# Patient Record
Sex: FEMALE | Employment: UNEMPLOYED | URBAN - METROPOLITAN AREA
[De-identification: names, ages, dates, MRNs, and addresses within clinical notes are randomized per-mention and may not be internally consistent; named-entity substitution may affect disease eponyms.]

---

## 2017-01-01 ENCOUNTER — HOSPITAL ENCOUNTER (INPATIENT)
Age: 67
LOS: 6 days | End: 2017-08-31
Attending: INTERNAL MEDICINE | Admitting: INTERNAL MEDICINE
Payer: OTHER MISCELLANEOUS

## 2017-01-01 ENCOUNTER — HOSPICE ADMISSION (OUTPATIENT)
Dept: HOSPICE | Age: 67
End: 2017-01-01
Payer: OTHER MISCELLANEOUS

## 2017-01-01 VITALS
DIASTOLIC BLOOD PRESSURE: 20 MMHG | SYSTOLIC BLOOD PRESSURE: 72 MMHG | TEMPERATURE: 102 F | RESPIRATION RATE: 12 BRPM | HEART RATE: 128 BPM | OXYGEN SATURATION: 55 %

## 2017-01-01 DIAGNOSIS — R45.1 AGITATION REQUIRING SEDATION PROTOCOL: ICD-10-CM

## 2017-01-01 DIAGNOSIS — Z71.89 GOALS OF CARE, COUNSELING/DISCUSSION: ICD-10-CM

## 2017-01-01 DIAGNOSIS — G89.3 CANCER ASSOCIATED PAIN: ICD-10-CM

## 2017-01-01 DIAGNOSIS — C34.90 METASTATIC LUNG CANCER (METASTASIS FROM LUNG TO OTHER SITE), UNSPECIFIED LATERALITY (HCC): ICD-10-CM

## 2017-01-01 PROCEDURE — 74011000250 HC RX REV CODE- 250: Performed by: FAMILY MEDICINE

## 2017-01-01 PROCEDURE — 74011000250 HC RX REV CODE- 250: Performed by: INTERNAL MEDICINE

## 2017-01-01 PROCEDURE — 74011250636 HC RX REV CODE- 250/636: Performed by: FAMILY MEDICINE

## 2017-01-01 PROCEDURE — 74011250637 HC RX REV CODE- 250/637: Performed by: INTERNAL MEDICINE

## 2017-01-01 PROCEDURE — 0656 HSPC GENERAL INPATIENT

## 2017-01-01 PROCEDURE — 74011250636 HC RX REV CODE- 250/636: Performed by: INTERNAL MEDICINE

## 2017-01-01 RX ORDER — HYDROMORPHONE HYDROCHLORIDE 2 MG/ML
1 INJECTION, SOLUTION INTRAMUSCULAR; INTRAVENOUS; SUBCUTANEOUS EVERY 4 HOURS
Status: DISCONTINUED | OUTPATIENT
Start: 2017-01-01 | End: 2017-01-01

## 2017-01-01 RX ORDER — HYDROMORPHONE HYDROCHLORIDE 1 MG/ML
1 INJECTION, SOLUTION INTRAMUSCULAR; INTRAVENOUS; SUBCUTANEOUS
Status: DISCONTINUED | OUTPATIENT
Start: 2017-01-01 | End: 2017-01-01

## 2017-01-01 RX ORDER — LORAZEPAM 2 MG/ML
2 INJECTION INTRAMUSCULAR
Status: DISCONTINUED | OUTPATIENT
Start: 2017-01-01 | End: 2017-01-01 | Stop reason: HOSPADM

## 2017-01-01 RX ORDER — LORAZEPAM 2 MG/ML
2 INJECTION INTRAMUSCULAR
Status: DISCONTINUED | OUTPATIENT
Start: 2017-01-01 | End: 2017-01-01

## 2017-01-01 RX ORDER — FACIAL-BODY WIPES
10 EACH TOPICAL DAILY PRN
Status: DISCONTINUED | OUTPATIENT
Start: 2017-01-01 | End: 2017-01-01 | Stop reason: HOSPADM

## 2017-01-01 RX ORDER — SENNOSIDES 8.8 MG/5ML
5 LIQUID ORAL
Status: DISCONTINUED | OUTPATIENT
Start: 2017-01-01 | End: 2017-01-01

## 2017-01-01 RX ORDER — HYDROMORPHONE HYDROCHLORIDE 1 MG/ML
4 INJECTION, SOLUTION INTRAMUSCULAR; INTRAVENOUS; SUBCUTANEOUS
Status: DISCONTINUED | OUTPATIENT
Start: 2017-01-01 | End: 2017-01-01 | Stop reason: HOSPADM

## 2017-01-01 RX ORDER — SENNOSIDES 8.6 MG/1
2 TABLET ORAL DAILY
Status: DISCONTINUED | OUTPATIENT
Start: 2017-01-01 | End: 2017-01-01

## 2017-01-01 RX ORDER — HALOPERIDOL 5 MG/ML
2 INJECTION INTRAMUSCULAR
Status: DISCONTINUED | OUTPATIENT
Start: 2017-01-01 | End: 2017-01-01 | Stop reason: HOSPADM

## 2017-01-01 RX ORDER — GLYCOPYRROLATE 0.2 MG/ML
0.2 INJECTION INTRAMUSCULAR; INTRAVENOUS
Status: DISCONTINUED | OUTPATIENT
Start: 2017-01-01 | End: 2017-01-01

## 2017-01-01 RX ORDER — MORPHINE SULFATE 20 MG/ML
10 SOLUTION ORAL 4 TIMES DAILY
Status: DISCONTINUED | OUTPATIENT
Start: 2017-01-01 | End: 2017-01-01

## 2017-01-01 RX ORDER — DEXAMETHASONE 4 MG/1
2 TABLET ORAL EVERY 12 HOURS
Status: DISCONTINUED | OUTPATIENT
Start: 2017-01-01 | End: 2017-01-01

## 2017-01-01 RX ORDER — MORPHINE SULFATE 20 MG/ML
10 SOLUTION ORAL
Status: DISCONTINUED | OUTPATIENT
Start: 2017-01-01 | End: 2017-01-01

## 2017-01-01 RX ORDER — HYDROMORPHONE HYDROCHLORIDE 2 MG/ML
4 INJECTION, SOLUTION INTRAMUSCULAR; INTRAVENOUS; SUBCUTANEOUS
Status: DISCONTINUED | OUTPATIENT
Start: 2017-01-01 | End: 2017-01-01 | Stop reason: HOSPADM

## 2017-01-01 RX ORDER — HYDROMORPHONE HCL IN 0.9% NACL 15 MG/30ML
PATIENT CONTROLLED ANALGESIA VIAL INTRAVENOUS CONTINUOUS
Status: DISCONTINUED | OUTPATIENT
Start: 2017-01-01 | End: 2017-01-01

## 2017-01-01 RX ORDER — GLYCOPYRROLATE 0.2 MG/ML
0.2 INJECTION INTRAMUSCULAR; INTRAVENOUS
Status: DISCONTINUED | OUTPATIENT
Start: 2017-01-01 | End: 2017-01-01 | Stop reason: HOSPADM

## 2017-01-01 RX ORDER — GLYCOPYRROLATE 0.2 MG/ML
0.2 INJECTION INTRAMUSCULAR; INTRAVENOUS EVERY 4 HOURS
Status: DISCONTINUED | OUTPATIENT
Start: 2017-01-01 | End: 2017-01-01 | Stop reason: HOSPADM

## 2017-01-01 RX ORDER — ATROPINE SULFATE 10 MG/ML
3 SOLUTION/ DROPS OPHTHALMIC
Status: DISCONTINUED | OUTPATIENT
Start: 2017-01-01 | End: 2017-01-01 | Stop reason: HOSPADM

## 2017-01-01 RX ORDER — FENTANYL 25 UG/1
1 PATCH TRANSDERMAL
Status: DISCONTINUED | OUTPATIENT
Start: 2017-01-01 | End: 2017-01-01

## 2017-01-01 RX ORDER — LORAZEPAM 2 MG/ML
2 INJECTION INTRAMUSCULAR EVERY 4 HOURS
Status: DISCONTINUED | OUTPATIENT
Start: 2017-01-01 | End: 2017-01-01

## 2017-01-01 RX ORDER — HALOPERIDOL 2 MG/ML
2 SOLUTION ORAL
Status: DISCONTINUED | OUTPATIENT
Start: 2017-01-01 | End: 2017-01-01

## 2017-01-01 RX ORDER — PHENOBARBITAL SODIUM 130 MG/ML
30 INJECTION INTRAMUSCULAR
Status: DISCONTINUED | OUTPATIENT
Start: 2017-01-01 | End: 2017-01-01 | Stop reason: HOSPADM

## 2017-01-01 RX ORDER — DEXAMETHASONE SODIUM PHOSPHATE 4 MG/ML
2 INJECTION, SOLUTION INTRA-ARTICULAR; INTRALESIONAL; INTRAMUSCULAR; INTRAVENOUS; SOFT TISSUE EVERY 12 HOURS
Status: DISCONTINUED | OUTPATIENT
Start: 2017-01-01 | End: 2017-01-01

## 2017-01-01 RX ORDER — LORAZEPAM 2 MG/ML
2 CONCENTRATE ORAL EVERY 4 HOURS
Status: DISCONTINUED | OUTPATIENT
Start: 2017-01-01 | End: 2017-01-01

## 2017-01-01 RX ORDER — HYOSCYAMINE SULFATE 0.12 MG/1
0.12 TABLET SUBLINGUAL
Status: DISCONTINUED | OUTPATIENT
Start: 2017-01-01 | End: 2017-01-01

## 2017-01-01 RX ORDER — PROCHLORPERAZINE MALEATE 10 MG
10 TABLET ORAL
Status: DISCONTINUED | OUTPATIENT
Start: 2017-01-01 | End: 2017-01-01 | Stop reason: HOSPADM

## 2017-01-01 RX ORDER — LORAZEPAM 2 MG/ML
1 CONCENTRATE ORAL
Status: DISCONTINUED | OUTPATIENT
Start: 2017-01-01 | End: 2017-01-01

## 2017-01-01 RX ORDER — AMOXICILLIN 250 MG
2 CAPSULE ORAL
Status: DISCONTINUED | OUTPATIENT
Start: 2017-01-01 | End: 2017-01-01

## 2017-01-01 RX ORDER — NYSTATIN 100000 [USP'U]/ML
500000 SUSPENSION ORAL 4 TIMES DAILY
Status: DISCONTINUED | OUTPATIENT
Start: 2017-01-01 | End: 2017-01-01

## 2017-01-01 RX ORDER — KETOROLAC TROMETHAMINE 30 MG/ML
15 INJECTION, SOLUTION INTRAMUSCULAR; INTRAVENOUS
Status: DISCONTINUED | OUTPATIENT
Start: 2017-01-01 | End: 2017-01-01 | Stop reason: HOSPADM

## 2017-01-01 RX ORDER — LORAZEPAM 2 MG/ML
4 INJECTION INTRAMUSCULAR
Status: DISCONTINUED | OUTPATIENT
Start: 2017-01-01 | End: 2017-01-01 | Stop reason: HOSPADM

## 2017-01-01 RX ORDER — HYDROMORPHONE HYDROCHLORIDE 10 MG/ML
4 INJECTION INTRAMUSCULAR; INTRAVENOUS; SUBCUTANEOUS
Status: DISCONTINUED | OUTPATIENT
Start: 2017-01-01 | End: 2017-01-01

## 2017-01-01 RX ORDER — ZOLPIDEM TARTRATE 5 MG/1
5 TABLET ORAL
Status: DISCONTINUED | OUTPATIENT
Start: 2017-01-01 | End: 2017-01-01

## 2017-01-01 RX ORDER — ACETAMINOPHEN 650 MG/1
650 SUPPOSITORY RECTAL
Status: DISCONTINUED | OUTPATIENT
Start: 2017-01-01 | End: 2017-01-01 | Stop reason: HOSPADM

## 2017-01-01 RX ORDER — ATROPINE SULFATE 10 MG/ML
1 SOLUTION/ DROPS OPHTHALMIC
Status: DISCONTINUED | OUTPATIENT
Start: 2017-01-01 | End: 2017-01-01

## 2017-01-01 RX ADMIN — LORAZEPAM 4 MG: 2 INJECTION INTRAMUSCULAR at 08:30

## 2017-01-01 RX ADMIN — HYDROMORPHONE HYDROCHLORIDE 4 MG: 2 INJECTION, SOLUTION INTRAMUSCULAR; INTRAVENOUS; SUBCUTANEOUS at 08:00

## 2017-01-01 RX ADMIN — GLYCOPYRROLATE 0.2 MG: 0.2 INJECTION INTRAMUSCULAR; INTRAVENOUS at 22:26

## 2017-01-01 RX ADMIN — Medication: at 10:13

## 2017-01-01 RX ADMIN — NYSTATIN 500000 UNITS: 100000 SUSPENSION ORAL at 17:52

## 2017-01-01 RX ADMIN — NYSTATIN 500000 UNITS: 100000 SUSPENSION ORAL at 12:12

## 2017-01-01 RX ADMIN — HYDROMORPHONE HYDROCHLORIDE 4 MG: 2 INJECTION, SOLUTION INTRAMUSCULAR; INTRAVENOUS; SUBCUTANEOUS at 16:00

## 2017-01-01 RX ADMIN — MORPHINE SULFATE 10 MG: 20 SOLUTION ORAL at 18:19

## 2017-01-01 RX ADMIN — HYDROMORPHONE HYDROCHLORIDE 4 MG: 2 INJECTION, SOLUTION INTRAMUSCULAR; INTRAVENOUS; SUBCUTANEOUS at 22:00

## 2017-01-01 RX ADMIN — LORAZEPAM 4 MG: 2 INJECTION INTRAMUSCULAR at 20:01

## 2017-01-01 RX ADMIN — HYDROMORPHONE HYDROCHLORIDE 4 MG: 2 INJECTION, SOLUTION INTRAMUSCULAR; INTRAVENOUS; SUBCUTANEOUS at 14:00

## 2017-01-01 RX ADMIN — LORAZEPAM 4 MG: 2 INJECTION INTRAMUSCULAR at 20:23

## 2017-01-01 RX ADMIN — Medication: at 09:04

## 2017-01-01 RX ADMIN — HYDROMORPHONE HYDROCHLORIDE 4 MG: 2 INJECTION, SOLUTION INTRAMUSCULAR; INTRAVENOUS; SUBCUTANEOUS at 20:00

## 2017-01-01 RX ADMIN — HYDROMORPHONE HYDROCHLORIDE 4 MG: 2 INJECTION, SOLUTION INTRAMUSCULAR; INTRAVENOUS; SUBCUTANEOUS at 06:00

## 2017-01-01 RX ADMIN — LORAZEPAM 4 MG: 2 INJECTION INTRAMUSCULAR at 02:00

## 2017-01-01 RX ADMIN — LORAZEPAM 4 MG: 2 INJECTION INTRAMUSCULAR at 16:37

## 2017-01-01 RX ADMIN — LORAZEPAM 4 MG: 2 INJECTION INTRAMUSCULAR at 14:19

## 2017-01-01 RX ADMIN — HALOPERIDOL LACTATE 2 MG: 5 INJECTION, SOLUTION INTRAMUSCULAR at 07:36

## 2017-01-01 RX ADMIN — LORAZEPAM 1 MG: 2 SOLUTION, CONCENTRATE ORAL at 01:55

## 2017-01-01 RX ADMIN — HYDROMORPHONE HYDROCHLORIDE 4 MG: 2 INJECTION, SOLUTION INTRAMUSCULAR; INTRAVENOUS; SUBCUTANEOUS at 04:00

## 2017-01-01 RX ADMIN — LORAZEPAM 4 MG: 2 INJECTION INTRAMUSCULAR at 18:01

## 2017-01-01 RX ADMIN — LORAZEPAM 4 MG: 2 INJECTION INTRAMUSCULAR at 00:00

## 2017-01-01 RX ADMIN — ATROPINE SULFATE 1 DROP: 10 SOLUTION/ DROPS OPHTHALMIC at 09:54

## 2017-01-01 RX ADMIN — Medication: at 11:00

## 2017-01-01 RX ADMIN — MORPHINE SULFATE 10 MG: 20 SOLUTION ORAL at 00:06

## 2017-01-01 RX ADMIN — LORAZEPAM 2 MG: 2 INJECTION INTRAMUSCULAR; INTRAVENOUS at 00:05

## 2017-01-01 RX ADMIN — HYDROMORPHONE HYDROCHLORIDE 4 MG: 2 INJECTION, SOLUTION INTRAMUSCULAR; INTRAVENOUS; SUBCUTANEOUS at 12:00

## 2017-01-01 RX ADMIN — LORAZEPAM 4 MG: 2 INJECTION INTRAMUSCULAR at 18:23

## 2017-01-01 RX ADMIN — LORAZEPAM 4 MG: 2 INJECTION INTRAMUSCULAR at 22:00

## 2017-01-01 RX ADMIN — MORPHINE SULFATE 10 MG: 20 SOLUTION ORAL at 18:00

## 2017-01-01 RX ADMIN — HYDROMORPHONE HYDROCHLORIDE 4 MG: 2 INJECTION, SOLUTION INTRAMUSCULAR; INTRAVENOUS; SUBCUTANEOUS at 10:00

## 2017-01-01 RX ADMIN — ATROPINE SULFATE 1 DROP: 10 SOLUTION/ DROPS OPHTHALMIC at 15:00

## 2017-01-01 RX ADMIN — Medication: at 14:20

## 2017-01-01 RX ADMIN — HALOPERIDOL LACTATE 2 MG: 5 INJECTION, SOLUTION INTRAMUSCULAR at 12:10

## 2017-01-01 RX ADMIN — LORAZEPAM 2 MG: 2 INJECTION INTRAMUSCULAR; INTRAVENOUS at 15:10

## 2017-01-01 RX ADMIN — Medication: at 05:28

## 2017-01-01 RX ADMIN — HYDROMORPHONE HYDROCHLORIDE 4 MG: 2 INJECTION, SOLUTION INTRAMUSCULAR; INTRAVENOUS; SUBCUTANEOUS at 02:00

## 2017-01-01 RX ADMIN — GLYCOPYRROLATE 0.2 MG: 0.2 INJECTION INTRAMUSCULAR; INTRAVENOUS at 01:55

## 2017-01-01 RX ADMIN — HYDROMORPHONE HYDROCHLORIDE 4 MG: 2 INJECTION, SOLUTION INTRAMUSCULAR; INTRAVENOUS; SUBCUTANEOUS at 00:00

## 2017-01-01 RX ADMIN — KETOROLAC TROMETHAMINE 15 MG: 30 INJECTION, SOLUTION INTRAMUSCULAR at 00:07

## 2017-01-01 RX ADMIN — ATROPINE SULFATE 3 DROP: 10 SOLUTION/ DROPS OPHTHALMIC at 08:00

## 2017-01-01 RX ADMIN — LORAZEPAM 2 MG: 2 INJECTION INTRAMUSCULAR at 03:57

## 2017-01-01 RX ADMIN — LORAZEPAM 4 MG: 2 INJECTION INTRAMUSCULAR at 16:28

## 2017-01-01 RX ADMIN — GLYCOPYRROLATE 0.2 MG: 0.2 INJECTION INTRAMUSCULAR; INTRAVENOUS at 14:00

## 2017-01-01 RX ADMIN — LORAZEPAM 4 MG: 2 INJECTION INTRAMUSCULAR at 08:00

## 2017-01-01 RX ADMIN — LORAZEPAM 4 MG: 2 INJECTION INTRAMUSCULAR at 01:44

## 2017-01-01 RX ADMIN — Medication: at 12:11

## 2017-01-01 RX ADMIN — ATROPINE SULFATE 1 DROP: 10 SOLUTION/ DROPS OPHTHALMIC at 05:48

## 2017-01-01 RX ADMIN — HALOPERIDOL LACTATE 2 MG: 2 SOLUTION, CONCENTRATE ORAL at 00:06

## 2017-01-01 RX ADMIN — LORAZEPAM 4 MG: 2 INJECTION INTRAMUSCULAR at 08:02

## 2017-01-01 RX ADMIN — ATROPINE SULFATE 3 DROP: 10 SOLUTION/ DROPS OPHTHALMIC at 16:13

## 2017-01-01 RX ADMIN — LORAZEPAM 2 MG: 2 INJECTION INTRAMUSCULAR; INTRAVENOUS at 14:21

## 2017-01-01 RX ADMIN — LORAZEPAM 4 MG: 2 INJECTION INTRAMUSCULAR at 10:54

## 2017-01-01 RX ADMIN — NYSTATIN 500000 UNITS: 100000 SUSPENSION ORAL at 21:08

## 2017-01-01 RX ADMIN — GLYCOPYRROLATE 0.2 MG: 0.2 INJECTION INTRAMUSCULAR; INTRAVENOUS at 17:00

## 2017-01-01 RX ADMIN — LORAZEPAM 2 MG: 2 INJECTION INTRAMUSCULAR; INTRAVENOUS at 07:37

## 2017-01-01 RX ADMIN — LORAZEPAM 4 MG: 2 INJECTION INTRAMUSCULAR at 12:10

## 2017-01-01 RX ADMIN — LORAZEPAM 2 MG: 2 INJECTION INTRAMUSCULAR at 19:49

## 2017-01-01 RX ADMIN — LORAZEPAM 4 MG: 2 INJECTION INTRAMUSCULAR at 10:11

## 2017-01-01 RX ADMIN — HYDROMORPHONE HYDROCHLORIDE 4 MG: 2 INJECTION, SOLUTION INTRAMUSCULAR; INTRAVENOUS; SUBCUTANEOUS at 18:00

## 2017-01-01 RX ADMIN — MORPHINE SULFATE 10 MG: 20 SOLUTION ORAL at 01:56

## 2017-01-01 RX ADMIN — LORAZEPAM 2 MG: 2 INJECTION INTRAMUSCULAR; INTRAVENOUS at 07:54

## 2017-01-01 RX ADMIN — GLYCOPYRROLATE 0.2 MG: 0.2 INJECTION INTRAMUSCULAR; INTRAVENOUS at 12:11

## 2017-01-01 RX ADMIN — LORAZEPAM 4 MG: 2 INJECTION INTRAMUSCULAR at 17:49

## 2017-01-01 RX ADMIN — GLYCOPYRROLATE 0.2 MG: 0.2 INJECTION INTRAMUSCULAR; INTRAVENOUS at 06:44

## 2017-01-01 RX ADMIN — HALOPERIDOL LACTATE 2 MG: 5 INJECTION, SOLUTION INTRAMUSCULAR at 19:49

## 2017-01-01 RX ADMIN — LORAZEPAM 2 MG: 2 SOLUTION, CONCENTRATE ORAL at 19:44

## 2017-01-01 RX ADMIN — GLYCOPYRROLATE 0.2 MG: 0.2 INJECTION INTRAMUSCULAR; INTRAVENOUS at 20:35

## 2017-01-01 RX ADMIN — LORAZEPAM 2 MG: 2 INJECTION INTRAMUSCULAR; INTRAVENOUS at 10:28

## 2017-01-01 RX ADMIN — LORAZEPAM 4 MG: 2 INJECTION INTRAMUSCULAR at 21:46

## 2017-01-01 RX ADMIN — HALOPERIDOL LACTATE 2 MG: 5 INJECTION, SOLUTION INTRAMUSCULAR at 04:09

## 2017-01-01 RX ADMIN — LORAZEPAM 2 MG: 2 INJECTION INTRAMUSCULAR at 16:13

## 2017-01-01 RX ADMIN — LORAZEPAM 4 MG: 2 INJECTION INTRAMUSCULAR at 10:00

## 2017-01-01 RX ADMIN — GLYCOPYRROLATE 0.2 MG: 0.2 INJECTION INTRAMUSCULAR; INTRAVENOUS at 05:16

## 2017-01-01 RX ADMIN — LORAZEPAM 2 MG: 2 SOLUTION, CONCENTRATE ORAL at 23:43

## 2017-01-01 RX ADMIN — NYSTATIN 500000 UNITS: 100000 SUSPENSION ORAL at 18:23

## 2017-01-01 RX ADMIN — DEXAMETHASONE 2 MG: 4 TABLET ORAL at 21:08

## 2017-01-01 RX ADMIN — GLYCOPYRROLATE 0.2 MG: 0.2 INJECTION INTRAMUSCULAR; INTRAVENOUS at 07:36

## 2017-01-01 RX ADMIN — LORAZEPAM 4 MG: 2 INJECTION INTRAMUSCULAR at 23:58

## 2017-01-01 RX ADMIN — DEXAMETHASONE SODIUM PHOSPHATE 2 MG: 4 INJECTION, SOLUTION INTRAMUSCULAR; INTRAVENOUS at 07:53

## 2017-01-01 RX ADMIN — LORAZEPAM 4 MG: 2 INJECTION INTRAMUSCULAR at 22:14

## 2017-01-01 RX ADMIN — LORAZEPAM 2 MG: 2 SOLUTION, CONCENTRATE ORAL at 17:00

## 2017-01-01 RX ADMIN — LORAZEPAM 2 MG: 2 INJECTION INTRAMUSCULAR; INTRAVENOUS at 09:17

## 2017-01-01 RX ADMIN — LORAZEPAM 4 MG: 2 INJECTION INTRAMUSCULAR at 04:33

## 2017-01-01 RX ADMIN — GLYCOPYRROLATE 0.2 MG: 0.2 INJECTION INTRAMUSCULAR; INTRAVENOUS at 00:06

## 2017-01-01 RX ADMIN — MORPHINE SULFATE 10 MG: 20 SOLUTION ORAL at 21:08

## 2017-01-01 RX ADMIN — LORAZEPAM 2 MG: 2 INJECTION INTRAMUSCULAR; INTRAVENOUS at 19:28

## 2017-01-01 RX ADMIN — Medication: at 10:00

## 2017-01-01 RX ADMIN — GLYCOPYRROLATE 0.2 MG: 0.2 INJECTION INTRAMUSCULAR; INTRAVENOUS at 14:19

## 2017-01-01 RX ADMIN — HYDROMORPHONE HYDROCHLORIDE 1 MG: 2 INJECTION, SOLUTION INTRAMUSCULAR; INTRAVENOUS; SUBCUTANEOUS at 08:00

## 2017-01-01 RX ADMIN — GLYCOPYRROLATE 0.2 MG: 0.2 INJECTION, SOLUTION INTRAMUSCULAR; INTRAVENOUS at 02:00

## 2017-01-01 RX ADMIN — GLYCOPYRROLATE 0.2 MG: 0.2 INJECTION INTRAMUSCULAR; INTRAVENOUS at 18:01

## 2017-01-01 RX ADMIN — LORAZEPAM 4 MG: 2 INJECTION INTRAMUSCULAR at 06:26

## 2017-01-01 RX ADMIN — LORAZEPAM 2 MG: 2 INJECTION INTRAMUSCULAR; INTRAVENOUS at 03:32

## 2017-01-01 RX ADMIN — HYDROMORPHONE HYDROCHLORIDE 4 MG: 1 INJECTION, SOLUTION INTRAMUSCULAR; INTRAVENOUS; SUBCUTANEOUS at 11:17

## 2017-01-01 RX ADMIN — ATROPINE SULFATE 3 DROP: 10 SOLUTION/ DROPS OPHTHALMIC at 02:00

## 2017-01-01 RX ADMIN — ATROPINE SULFATE 3 DROP: 10 SOLUTION/ DROPS OPHTHALMIC at 22:00

## 2017-01-01 RX ADMIN — LORAZEPAM 2 MG: 2 INJECTION INTRAMUSCULAR; INTRAVENOUS at 17:53

## 2017-01-01 RX ADMIN — LORAZEPAM 4 MG: 2 INJECTION INTRAMUSCULAR at 14:41

## 2017-01-01 RX ADMIN — LORAZEPAM 4 MG: 2 INJECTION INTRAMUSCULAR at 19:49

## 2017-01-01 RX ADMIN — LORAZEPAM 2 MG: 2 INJECTION INTRAMUSCULAR; INTRAVENOUS at 13:49

## 2017-01-01 RX ADMIN — LORAZEPAM 2 MG: 2 INJECTION INTRAMUSCULAR; INTRAVENOUS at 01:55

## 2017-01-01 RX ADMIN — Medication: at 08:17

## 2017-01-01 RX ADMIN — MORPHINE SULFATE 10 MG: 20 SOLUTION ORAL at 19:35

## 2017-01-01 RX ADMIN — Medication: at 18:38

## 2017-01-01 RX ADMIN — LORAZEPAM 4 MG: 2 INJECTION INTRAMUSCULAR at 16:06

## 2017-01-01 RX ADMIN — LORAZEPAM 4 MG: 2 INJECTION INTRAMUSCULAR at 09:53

## 2017-01-01 RX ADMIN — ATROPINE SULFATE 3 DROP: 10 SOLUTION/ DROPS OPHTHALMIC at 06:45

## 2017-01-01 RX ADMIN — LORAZEPAM 4 MG: 2 INJECTION INTRAMUSCULAR at 11:43

## 2017-01-01 RX ADMIN — LORAZEPAM 4 MG: 2 INJECTION INTRAMUSCULAR at 23:46

## 2017-01-01 RX ADMIN — LORAZEPAM 4 MG: 2 INJECTION INTRAMUSCULAR at 13:59

## 2017-01-01 RX ADMIN — GLYCOPYRROLATE 0.2 MG: 0.2 INJECTION INTRAMUSCULAR; INTRAVENOUS at 08:03

## 2017-01-01 RX ADMIN — LORAZEPAM 2 MG: 2 INJECTION INTRAMUSCULAR at 03:42

## 2017-01-01 RX ADMIN — LORAZEPAM 4 MG: 2 INJECTION INTRAMUSCULAR at 03:49

## 2017-01-01 RX ADMIN — NYSTATIN 500000 UNITS: 100000 SUSPENSION ORAL at 07:53

## 2017-01-01 RX ADMIN — HYDROMORPHONE HYDROCHLORIDE 4 MG: 2 INJECTION, SOLUTION INTRAMUSCULAR; INTRAVENOUS; SUBCUTANEOUS at 20:30

## 2017-01-01 RX ADMIN — LORAZEPAM 4 MG: 2 INJECTION INTRAMUSCULAR at 18:29

## 2017-01-01 RX ADMIN — LORAZEPAM 4 MG: 2 INJECTION INTRAMUSCULAR at 09:48

## 2017-01-01 RX ADMIN — LORAZEPAM 2 MG: 2 INJECTION INTRAMUSCULAR; INTRAVENOUS at 02:50

## 2017-01-01 RX ADMIN — LORAZEPAM 4 MG: 2 INJECTION INTRAMUSCULAR at 06:00

## 2017-01-01 RX ADMIN — LORAZEPAM 4 MG: 2 INJECTION INTRAMUSCULAR at 20:30

## 2017-01-01 RX ADMIN — LORAZEPAM 4 MG: 2 INJECTION INTRAMUSCULAR at 14:22

## 2017-01-01 RX ADMIN — LORAZEPAM 4 MG: 2 INJECTION INTRAMUSCULAR at 04:10

## 2017-01-01 RX ADMIN — LORAZEPAM 2 MG: 2 INJECTION INTRAMUSCULAR; INTRAVENOUS at 22:01

## 2017-01-01 RX ADMIN — LORAZEPAM 4 MG: 2 INJECTION INTRAMUSCULAR at 02:25

## 2017-01-01 RX ADMIN — LORAZEPAM 4 MG: 2 INJECTION INTRAMUSCULAR at 04:01

## 2017-01-01 RX ADMIN — LORAZEPAM 2 MG: 2 INJECTION INTRAMUSCULAR; INTRAVENOUS at 05:28

## 2017-01-01 RX ADMIN — LORAZEPAM 4 MG: 2 INJECTION INTRAMUSCULAR at 06:22

## 2017-01-01 RX ADMIN — LORAZEPAM 4 MG: 2 INJECTION INTRAMUSCULAR at 00:06

## 2017-01-01 RX ADMIN — HALOPERIDOL LACTATE 2 MG: 5 INJECTION, SOLUTION INTRAMUSCULAR at 03:56

## 2017-01-01 RX ADMIN — Medication: at 09:49

## 2017-01-01 RX ADMIN — GLYCOPYRROLATE 0.2 MG: 0.2 INJECTION INTRAMUSCULAR; INTRAVENOUS at 08:02

## 2017-01-01 RX ADMIN — LORAZEPAM 4 MG: 2 INJECTION INTRAMUSCULAR at 12:37

## 2017-08-25 NOTE — PROGRESS NOTES
1600  Pt arrived at the [facility]. Pt was alert to self. Pt is thriving in bed. Pt reports pain is 8/10. Daughter at the bedside. Crystal 145 at the bedside writing notes.

## 2017-08-26 NOTE — PROGRESS NOTES
Problem: Falls - Risk of  Goal: *Absence of Falls  Document Ganesh Fall Risk and appropriate interventions in the flowsheet.    Outcome: Progressing Towards Goal  Fall Risk Interventions:  Mobility Interventions: Communicate number of staff needed for ambulation/transfer, Bed/chair exit alarm     Mentation Interventions: Bed/chair exit alarm, Door open when patient unattended, Family/sitter at bedside     Medication Interventions: Bed/chair exit alarm     Elimination Interventions: Bed/chair exit alarm, Call light in reach, Patient to call for help with toileting needs     History of Falls Interventions: Bed/chair exit alarm, Door open when patient unattended, Evaluate medications/consider consulting pharmacy

## 2017-08-26 NOTE — PROGRESS NOTES
0700 Recd report from [facility], patient remains GIP LOC. Medication were changed last pm to intravenous and the Left chest port was accessed. Pt had issues with urination retention and a jefferson was inserted. Sister remained at bedside throught he night. 0800 Scheduled ativan and dilaudid, pt is currently writhing in bed and moaning, vital signs taken 98.3-89-24 90% saturation on room air, /60  0830 Pt has calmed slightly still tossing and turning in bed, bed alarms have been set  6687 Dr Nghia Ponce on board rounding on patient and reviewing medications. 0482 Ativan 2mg given PRN due to restlessness until order for Dilaudid PCA available. Updated family on careplan and medication changes. Verbalized understanding. 1028 Ativan 2mg prn given by coworker for restlessness. 1100 Dilaudid 0.5 mg bolus given for writhing in pain. 1200 Pain reassessed, pt is resting quietly at this time. 1200 Ativan held pt very comfortable at this time resp 8.  1400 Pt up opening eyes, breathing irregular, haspy sound, mouth care done, HR is dropping from 90 to 69, Sats 89%, pt is warm and perfusing. Family questions how long, EOL teaching reviewed with signs and symptoms. Pt starting to twist and turn in bed again, ativan 2mg given that was held at 1200 due to lethargy. Also 0.5 mg Dilaudid given at this time, pt repositioned. 1 Family called pt restless again, 1600 scheduled ativan 2mg given early, pt repositioned  1630 Resting quietly at this time, ativan effective. Asked family to turn heat down it is 78 degrees, they just wanted door open for a bit. 1800 PRN ativan for agitation, noted pt now has moist tracheal rales, MD notified and Robinul has been ordered. Ativan is also changed to every 2 hrs scheduled. Dilaudid PCA syringe changed. 1900 Report to oncoming shift.     NAME OF PATIENT:  Shantel      LEVEL OF CARE:  GIP     REASON FOR GIP:   Pain, despite numerous changes in medications, Terminal agitation, despite changes to medications, Medication adjustment that must be monitored 24/7 and Stabilizing treatment that cannot take place at home     *PATIENT REMAINS ELIGIBLE FOR [facility] LEVEL OF CARE AS EVIDENCED BY: (MUST BE ADDRESSED OF PATIENT GIP) Medications were changed during the night to intravenous via port.  Patient continues with increased agitation this am        REASON FOR RESPITE:  na     O2 SAFETY:  na     FALL INTERVENTIONS PROVIDED:   Implemented/recommended use of non-skid footwear, Implemented/recommended use of fall risk identification flag to all team members, Implemented/recommended resources for alarm system (personal alarm, bed alarm, call bell, etc.) , Implemented/recommended environmental changes (remove hazards, lower bed, improve lighting, etc.) and Implemented/recommended increased supervision/assistance     INTERDISPLINARY COMMUNICATION/COLLABORATION:  Physician, MSW, Marley and RN, CNA     NEW MEDICATION INITIATION DOCUMENTATION:  Consulted AT MD to report change in pt status, Obtained Order from Provider for initiation of symptom relief medication /other medication needed and Documentation completed in Clinical Note in [facility]     Reason medication is being initiated: Pain and agitation not managed     MD / Provider name consulted re: change in status / initiation of new medication:  Dr Maria Luisa Carrasquillo Symptom(s):  Thrush, Agitation, Terminal Restlessness     New Order(s):  Increased dosages and routes of medications ativan and dilaudid     Name of the person notified of the changes:  Sister     Name of person being taught:  Sister     Instructions given:  Haldol is for agitation, Nystatin for thrush, increased medications will help control terminal agitation     Side Effects taught:  lethargy, possible apnea     Response to teaching:  verbalized understanding        COMFORTABLE DYING MEASURE:  Is Patient/family satisfied with symptom level?  yes     DISCHARGE PLAN:  Family hoping to take pt home

## 2017-08-26 NOTE — HOSPICE
NAME OF PATIENT:  Christine Gipson    LEVEL OF CARE:  GIP    REASON FOR GIP:   Pain, despite numerous changes in medications, Terminal agitation, despite changes to medications and Medication adjustment that must be monitored 24/    *PATIENT REMAINS ELIGIBLE FOR GIP LEVEL OF CARE AS EVIDENCED BY: (MUST BE ADDRESSED OF PATIENT GIP) Pt is receiving prn and scheduled Ativan and Morphine for management of pain and agitation. Pt had to be changed to IVP Dilaudid and Ativan for continued pain and agitation. REASON FOR RESPITE:    O2 SAFETY:      FALL INTERVENTIONS PROVIDED:   Implemented/recommended use of non-skid footwear, Implemented/recommended use of fall risk identification flag to all team members, Implemented/recommended assistive devices and encouraged their use, Implemented/recommended resources for alarm system (personal alarm, bed alarm, call bell, etc.)  and Implemented/recommended environmental changes (remove hazards, lower bed, improve lighting, etc.)    INTERDISPLINARY COMMUNICATION/COLLABORATION:  Physician, MSW, Marley and RN, CNA    NEW MEDICATION INITIATION DOCUMENTATION:      Reason medication is being initiated:      MD / Provider name consulted re: change in status / initiation of new medication:      New Symptom(s):      New Order(s):    Name of the person notified of the changes:      Name of person being taught:      Instructions given:      Side Effects taught:      Response to teachin [address] free  Is Patient/family satisfied with symptom level?  yes    DISCHARGE PLAN:  Home with family once sx are managed and be followed by home hospice. 19:35,report received,assumed care of pt who is GIP for pain and agitation,hospice dx is lung and brain cancer. Family came to [facility] station and stated that pt needed medication. Pt is confused and restless,oriented to name only. She says her pain is 4/10,but cannot tell me where pain is. Pt medicated with Morphine SL. Pt is incontinent with a brief on. She has a covered lac to forehead from previous fall prior to admission. 21:30,pt is moving about in bed and is unable to answer questions,given scheduled Decadron,Morphine and Ativan. 23:30,laying sideways in bed,repositioned,easily aroused. Pt given scheduled Ativan SL for agitation. Brief remains dry. 23:43,remains agitated,medicated with Ativan SL. Pt asking to void,placed on bedpan,unable to go. 0006,medicated with Haldol,discussed placement of jefferson with pt and her sister,pt requested it. #16 jefferson placed and over 500cc urine drained immediately. Pt voices comfort now. Rectal vault checked and is empty. 02:00,c/o \"I have to pee\",agitated,medicated with Morphine and Ativan SL for comfort. 50:01,WO. Colleen Nicholson called,pt continues to holler out,\"help\". Left chest port accessed and IVP orders obtained. Pt medicated with Dilaudid 1mg IVP and Ativan 2mg IVP. Sister educated on use of Dilaudid,she voices understanding and hopes that pt will become comfortable soon. 04:15,Haldol 2mg IVP given for agitation. Pt will have her eyes closed then suddenly holler out!  05:00,pt sleeping!

## 2017-08-26 NOTE — H&P
[facility]  [phone]    Patient Name: Onelia Askew  YOB: 1950    Date of Provider Hospice Visit: 08/26/17    Level of Care:   [x] General Inpatient (GIP)    [] Routine   [] Respite    Location of Care:  [] [facility] [] [facility] [] [facility] [] [facility] [x] [facility]    Date of Original Hospice Admission: unclear but transferred to [facility] on 8/25/17  Hospice Medical Director for Inpatient Care: [address] [place] [address] Diagnosis: Lung cancer with mets to brain  Diagnoses RELATED to the terminal prognosis: Metastatic disease to brain, left frontal craniotomy with cerebral edema, right sided hemiplegia  Other Diagnoses:      Bridgett King is a 74y.o. year old who was admitted to [facility]. Patient has been under Hospice care at [address] [place] [address] for at least part of the month of August with [facility]. Have limited records but daughter(marisela) at bedside along with her sister and son-in-law. Patient dx with lung cancer in 2015 but then discovered to have metastatic disease to brain in July 2017. Underwent craniotomy at [facility] but had extensive edema and this had to be stopped. She then had gamma-knife early part of August and transferred to [facility]. Patient continued to decline and transitioned to Hospice care. Despite escalating doses of morphine and ativan, symptoms of pain/agiation unable to be controlled. Patient transferred to [facility] on evening of 8/25/17    The patient's principle diagnosis has resulted in no po intake, increasing pain and agitation      Functionally, the patient's Karnofsky and/or Palliative Performance Scale has declined over a period of weeks and is estimated at 20. The patient is dependent on the following ADLs:all    Objective information that support this patients limited prognosis includes: see note above. Do not have results of studies done at [facility] available. .      The patient/family chose comfort measures with the support of Hospice. HOSPICE DIAGNOSES   Active Symptoms:  1. Generalized pain, especially head secondary to cancer  2. Terminal agitation with moaning and crying out \"help me\"  3. Metastatic lung cancer to brain with recent procedures as noted above     PLAN   1. Patient admitted to GIP level of care because of recent medication adjustments not effective, rapid decline, and need for frequent nursing assessments. 2. Generalized pain-in the middle of night, port accessed because SL morphine not effective and started on scheduled dilaudid 1 mg IV every 4 hours. When I enter the room, patient writhing and asking for help, holding head and clearly uncomfortable. Sister who was with her last night stated dilaudid seemed to help for a couple hours  3. Will start dilaudid PCA-1 mg/hour basal with 0.5 bolus every 6 minutes. Explained how this works with family  4. Agitation-may be terminal-will start ativan 2 mg IV every 4 hours scheduled and every 15 minutes prn. In addition, haldol 2 mg IV every 4 hours prn  5. Reviewed plan with family. Daughter Amrik Ortiz is only child    6.  and SW to support family needs  7. Disposition: home if stabilizes but this appears unlikely    Prognosis estimated based on 08/26/17 clinical assessment is:   [x] Hours to Days    [] Days to Weeks    [] Other:    Communicated plan of care with: Hospice Case Manager; Hospice IDT; Care Team     GOALS OF CARE     Resuscitation Status: DNR  Durable DNR: [x] Yes [] No    No flowsheet data found. HISTORY     History obtained from: chart, daughter, son-in-law    CHIEF COMPLAINT: agitation  The patient is:   [x] Verbal  [] Nonverbal  [] Unresponsive    HPI/SUBJECTIVE:  Patient is verbal but very confused. Crying out to help her. She crawling around the bed with legs hanging over the side.         REVIEW OF SYSTEMS     The following systems were: [] reviewed  [] unable to be reviewed    Positive ROS include:  Constitutional:  Ears/nose/mouth/throat:  Respiratory:  Gastrointestinal:  Musculoskeletal:  Neurologic:  Psychiatric:  Endocrine:     Adult Non-Verbal Pain Assessment Score: 5/10    Face  [] 0   No particular expression or smile  [] 1   Occasional grimace, tearing, frowning, wrinkled forehead  [x] 2   Frequent grimace, tearing, frowning, wrinkled forehead    Activity (movement)  [] 0   Lying quietly, normal position  [] 1   Seeking attention through movement or slow, cautious movement  [x] 2   Restless, excessive activity and/or withdrawal reflexes    Guarding  [] 0   Lying quietly, no positioning of hands over areas of body  [x] 1   Splinting areas of the body, tense  [] 2   Rigid, stiff    Physiology (vital signs)  [x] 0   Stable vital signs  [] 1   Change in any of the following: SBP > 20mm Hg; HR > 20/minute  [] 2   Change in any of the following: SBP > 30mm Hg; HR > 25/minute    Respiratory  [x] 0   Baseline RR/SpO2, compliant with ventilator  [] 1   RR > 10 above baseline, or 5% drop SpO2, mild asynchrony with ventilator  [] 2   RR > 20 above baseline, or 10% drop SpO2, asynchrony with ventilator     FUNCTIONAL ASSESSMENT     Palliative Performance Scale (PPS):10-20     PSYCHOSOCIAL/SPIRITUAL ASSESSMENT     Active Problems:    * No active hospital problems. *    No past medical history on file. No past surgical history on file. Social History   Substance Use Topics    Smoking status: Not on file    Smokeless tobacco: Not on file    Alcohol use Not on file     No family history on file.    Allergies   Allergen Reactions    Pcn [Penicillins] Unknown (comments)      Current Facility-Administered Medications   Medication Dose Route Frequency    LORazepam (ATIVAN) injection 2 mg  2 mg IntraVENous Q4H    haloperidol lactate (HALDOL) injection 2 mg  2 mg IntraVENous Q4H PRN    HYDROmorphone (PF) (DILAUDID) injection 1 mg  1 mg IntraVENous Q1H PRN    LORazepam (ATIVAN) injection 2 mg  2 mg IntraVENous Q15MIN PRN    HYDROmorphone (PF) 15 mg/30 ml (DILAUDID) PCA   IntraVENous CONTINUOUS    bisacodyl (DULCOLAX) suppository 10 mg  10 mg Rectal DAILY PRN    hyoscyamine SL (LEVSIN/SL) tablet 0.125 mg  0.125 mg SubLINGual Q4H PRN    prochlorperazine (COMPAZINE) tablet 10 mg  10 mg Oral Q6H PRN    acetaminophen (TYLENOL) suppository 650 mg  650 mg Rectal Q4H PRN    nystatin (MYCOSTATIN) 100,000 unit/mL oral suspension 500,000 Units  500,000 Units Oral QID        PHYSICAL EXAM     Wt Readings from Last 3 Encounters:   No data found for Wt       Visit Vitals    /60    Pulse 89    Temp 98.3 °F (36.8 °C)    Resp 24    SpO2 90%       Supplemental O2  [] Yes  [x] NO  Last bowel movement:     Currently this patient has:  [] Peripheral IV [] PICC  [x] PORT [] ICD    [] Degroot Catheter [] NG Tube   [] PEG Tube    [] Rectal Tube [] Drain  [] Other:     Constitutional: thin, agitated, moaning  Eyes: sunken eyes  ENMT: small laceration on forehead, covered with steristrips  Cardiovascular: tachycardia  Respiratory: not labored, CTA  Gastrointestinal: soft  Musculoskeletal: muscle wasting  Skin: warm, dry  Neurologic: confused, agitated      Pertinent Lab and or Imaging Tests:  No results found for: NA, K, CL, CO2, AGAP, GLU, BUN, CREA, BUCR, GFRAA, GFRNA, CA, GFRAA  No results found for: TP, ALBR, TALB, ALB        Total time: 70  Counseling / coordination time:   > 50% counseling / coordination?:     This patient meets Hospice General Inpatient (GIP) Level of Care.     The precipitating event that resulted in the need for GIP was: pain and agitation    The interventions tried that have been unsuccessful at controlling symptoms include: sl morphine and ativan oral    Supporting documentation for GIP need for pain control:  [x] Frequent evaluation by a doctor, nurse practitioner, nurse   [x] Frequent medication adjustment    [x] IVs that cannot be administered at home   [x] Aggressive pain management   [] Complicated technical delivery of medications              Supporting documentation for GIP need for symptom control:  [x]  Sudden decline necessitating intensive nursing intervention  []  Uncontrolled / intractable nausea or vomiting   []  Pathological fractures  []  Advanced open wounds requiring frequent skilled care  [] Unmanageable respiratory distress  [x] New or worsening delirium   [] Delirium with behavior issues  [] Imminent death - with skilled nursing needs documented above

## 2017-08-26 NOTE — PROGRESS NOTES
1028:  Assisted Ozzie Hendrix RN with prn administration of lorazepam 2mg/IV; patient is restless (moving around in the bed) with deep gasps of breathing from time to time; family is present and educated family with EOL symptoms; family has \"Gone From My Sight\" book.

## 2017-08-27 NOTE — PROGRESS NOTES
0700 Recd report from [facility], pt remains GIP LOC for management of terminal agitation, pain and secretions, pt has had 12 mg of ativan scheduled and 6mg PRN, 4 doses of 0.5 mg dilaudid as well as Dilaudid 1mg hr continuous,   Two prn doses of robinul  0730 Assessment completed, pt is restless, legs outside of bed, PRN ativan, haldol and robinul administered. Updated family on careplan for today. 0815 Pt yelling out every 2 minutes, MD notified, orders recd  0830 Dilaudid PCA increased to 2mg hr basal and 1mg PCA bolus, atropine added to regimine  1000 Ativan 4mg scheduled and new PRN atropine for secretions given, family educated  56 Pt reassessed resting quietly. [address] from [facility] visit at this time updated on careplan  1215 Scheduled ativan 4mg given, PRN robinul and PRN haldol for agitation  Noted temp 101 pulled blankets back for now. Sisters remain at bedside. Mouth care  1600 Scheduled ativan and atropine drops. Pt is resting quietly at this time. 1700 Noted that Dilaudid PCA is not supplied on [facility] as previously thought. Pharmacy called. Syringe will be out soon. 1800 Pharmacy reported unable to provide PCA. Dr Rob Luis notified to change Dilaudid order. Will give pt 4mg q 2 hrs.  1900 Report to  oncoming shift. Reported that pt has been very calm, no signs of pain or distress on current regimen. NAME OF PATIENT:  Linette Cartwright      LEVEL OF CARE:  GIP      REASON FOR GIP:   Pain, despite numerous changes in medications, Terminal agitation, despite changes to medications, Medication adjustment that must be monitored 24/7 and Stabilizing treatment that cannot take place at home      *PATIENT REMAINS ELIGIBLE FOR GIP LEVEL OF CARE AS EVIDENCED BY: (MUST BE ADDRESSED OF PATIENT GIP) Medications were changed during the night to intravenous via port.  Patient continues with increased agitation this am          REASON FOR RESPITE:  na      O2 SAFETY:  na      FALL INTERVENTIONS PROVIDED:   Implemented/recommended use of non-skid footwear, Implemented/recommended use of fall risk identification flag to all team members, Implemented/recommended resources for alarm system (personal alarm, bed alarm, call bell, etc.) , Implemented/recommended environmental changes (remove hazards, lower bed, improve lighting, etc.) and Implemented/recommended increased supervision/assistance      INTERDISPLINARY COMMUNICATION/COLLABORATION:  Physician, MSW, Tasley and RN, CNA      NEW MEDICATION INITIATION DOCUMENTATION:  Consulted AT MD to report change in pt status, Obtained Order from Provider for initiation of symptom relief medication /other medication needed and Documentation completed in Clinical Note in [address]  Reason medication is being initiated: Pain and agitation not managed      MD / Provider name consulted re: change in status / initiation of new medication:  Dr Maurizio Schmidt Symptom(s):  Thrush, Agitation, Terminal Restlessness, increased pain      New Order(s):  Increased dosages and routes of medications ativan and dilaudid, added toradol, phenobarbital, atropine      Name of the person notified of the changes:  Sister      Name of person being taught:  Sister      Instructions given:  increased medications will help control terminal agitation, increased dilaudid for yelling out pain,       Side Effects taught:  lethargy, possible apnea, fever      Response to teaching:  verbalized understanding          COMFORTABLE DYING MEASURE:  Is Patient/family satisfied with symptom level?  yes      DISCHARGE PLAN:  EOL at [facility]          Revision History

## 2017-08-27 NOTE — PROGRESS NOTES
[facility]  [phone]    Patient Name: Roshni Cui  YOB: 1950    Date of Provider Hospice Visit: 08/27/17    Level of Care:   [x] General Inpatient (GIP)    [] Routine   [] Respite    Location of Care:  [] [facility] [] [facility] [] [address] [] [facility] [x] [facility]    Date of Original Hospice Admission: unclear but transferred to [facility] on 8/25/17  Hospice Medical Director for Inpatient Care: [address] [place] [address] Diagnosis: Lung cancer with mets to brain  Diagnoses RELATED to the terminal prognosis: Metastatic disease to brain, left frontal craniotomy with cerebral edema, right sided hemiplegia  Other Diagnoses:      Isidro Le is a 74y.o. year old who was admitted to [facility]. Patient has been under Hospice care at [address] [place] [address] for at least part of the month of August with [facility]. Have limited records but daughter(marisela) at bedside along with her sister and son-in-law. Patient dx with lung cancer in 2015 but then discovered to have metastatic disease to brain in July 2017. Underwent craniotomy at [facility] but had extensive edema and this had to be stopped. She then had gamma-knife early part of August and transferred to [facility]. Patient continued to decline and transitioned to Hospice care. Despite escalating doses of morphine and ativan, symptoms of pain/agiation unable to be controlled. Patient transferred to [facility] on evening of 8/25/17    The patient's principle diagnosis has resulted in no po intake, increasing pain and agitation      Functionally, the patient's Karnofsky and/or Palliative Performance Scale has declined over a period of weeks and is estimated at 20. The patient is dependent on the following ADLs:all    Objective information that support this patients limited prognosis includes: see note above. Do not have results of studies done at [facility] available. .      The patient/family chose comfort measures with the support of Hospice. HOSPICE DIAGNOSES   Active Symptoms:  1. Generalized pain, especially head secondary to cancer  2. Terminal agitation with moaning and crying out \"help me\"  3. Metastatic lung cancer to brain with recent procedures as noted above     PLAN   1. GIP level of care will be continued as patient still requiring frequent nursing assessments and change in medications. .   2. Generalized pain-patient at first seemed to be improved on the dilaudid PCA of 1 mg/hr basal. She required 13 bolus dosing in last 24 hours. Patient with increase pain/moaning this morning. 3. Increased her dilaudid PCA to 2 mg/hour bolus and the bolus to 1 mg every 6 minutes  4. Agitation-increased early this morning with an additional dosing of 6 mg IV in last 12 hours. Have increased ativan to 4 mg IV every 2 hours and added phenobarbital 30 mg every 6 hours prn agitation. Will consider scheduling this if needed. Have used haldol 2 mg x4 doses and will continue this prn.  5. Reviewed plan with family-5 sisters and daughter at bedside. 6. Secretions-have placed on robinul, atropine prn     7.  and SW to support family needs  8. Disposition: home if stabilizes but this appears unlikely    Prognosis estimated based on 08/27/17 clinical assessment is:   [x] Hours to Days    [] Days to Weeks    [] Other:    Communicated plan of care with: Hospice Case Manager; Hospice IDT; Care Team     GOALS OF CARE     Resuscitation Status: DNR  Durable DNR: [x] Yes [] No    No flowsheet data found. HISTORY     History obtained from: chart, daughter, son-in-law    CHIEF COMPLAINT: agitation  The patient is:   [x] Verbal  [] Nonverbal  [] Unresponsive    HPI/SUBJECTIVE:  Patient appears calmer by the time I see her.  She has had multiple doses of medications prior to my arrival. Not responsive to me at this time     REVIEW OF SYSTEMS     The following systems were: [] reviewed  [] unable to be reviewed    Positive ROS include:  Constitutional:  Ears/nose/mouth/throat:  Respiratory:  Gastrointestinal:  Musculoskeletal:  Neurologic:  Psychiatric:  Endocrine:     Adult Non-Verbal Pain Assessment Score: 2/10    Face  [] 0   No particular expression or smile  [x] 1   Occasional grimace, tearing, frowning, wrinkled forehead  [] 2   Frequent grimace, tearing, frowning, wrinkled forehead    Activity (movement)  [] 0   Lying quietly, normal position  [x] 1   Seeking attention through movement or slow, cautious movement  [] 2   Restless, excessive activity and/or withdrawal reflexes    Guarding  [] 0   Lying quietly, no positioning of hands over areas of body  [x] 1   Splinting areas of the body, tense  [] 2   Rigid, stiff    Physiology (vital signs)  [x] 0   Stable vital signs  [] 1   Change in any of the following: SBP > 20mm Hg; HR > 20/minute  [] 2   Change in any of the following: SBP > 30mm Hg; HR > 25/minute    Respiratory  [x] 0   Baseline RR/SpO2, compliant with ventilator  [] 1   RR > 10 above baseline, or 5% drop SpO2, mild asynchrony with ventilator  [] 2   RR > 20 above baseline, or 10% drop SpO2, asynchrony with ventilator     FUNCTIONAL ASSESSMENT     Palliative Performance Scale (PPS):10-20     PSYCHOSOCIAL/SPIRITUAL ASSESSMENT     Active Problems:    * No active hospital problems. *    No past medical history on file. No past surgical history on file. Social History   Substance Use Topics    Smoking status: Not on file    Smokeless tobacco: Not on file    Alcohol use Not on file     No family history on file.    Allergies   Allergen Reactions    Pcn [Penicillins] Unknown (comments)      Current Facility-Administered Medications   Medication Dose Route Frequency    LORazepam (ATIVAN) injection 4 mg  4 mg IntraVENous Q2H    atropine 1 % ophthalmic solution 1 Drop  1 Drop SubLINGual Q4H PRN    haloperidol lactate (HALDOL) injection 2 mg  2 mg IntraVENous Q4H PRN    HYDROmorphone (PF) (DILAUDID) injection 1 mg 1 mg IntraVENous Q1H PRN    HYDROmorphone (PF) 15 mg/30 ml (DILAUDID) PCA   IntraVENous CONTINUOUS    LORazepam (ATIVAN) injection 2 mg  2 mg IntraVENous Q15MIN PRN    glycopyrrolate (ROBINUL) injection 0.2 mg  0.2 mg IntraVENous Q4H PRN    bisacodyl (DULCOLAX) suppository 10 mg  10 mg Rectal DAILY PRN    hyoscyamine SL (LEVSIN/SL) tablet 0.125 mg  0.125 mg SubLINGual Q4H PRN    prochlorperazine (COMPAZINE) tablet 10 mg  10 mg Oral Q6H PRN    acetaminophen (TYLENOL) suppository 650 mg  650 mg Rectal Q4H PRN        PHYSICAL EXAM     Wt Readings from Last 3 Encounters:   No data found for Wt       Visit Vitals    /60    Pulse (!) 108    Temp 100.2 °F (37.9 °C)    Resp 8    SpO2 (!) 68%       Supplemental O2  [] Yes  [x] NO  Last bowel movement:     Currently this patient has:  [] Peripheral IV [] PICC  [x] PORT [] ICD    [] Degroot Catheter [] NG Tube   [] PEG Tube    [] Rectal Tube [] Drain  [] Other:     Constitutional: thin, calmer, not responsive to me  Eyes: sunken eyes  ENMT: small laceration on forehead, covered with steristrips  Cardiovascular: rrr  Respiratory: not labored, CTA  Gastrointestinal: soft  Musculoskeletal: muscle wasting  Skin: warm, dry  Neurologic: confused, agitated      Pertinent Lab and or Imaging Tests:  No results found for: NA, K, CL, CO2, AGAP, GLU, BUN, CREA, BUCR, GFRAA, GFRNA, CA, GFRAA  No results found for: TP, ALBR, TALB, ALB        Total time: 35  Counseling / coordination time:   > 50% counseling / coordination?:     This patient meets Hospice General Inpatient (GIP) Level of Care.     The precipitating event that resulted in the need for GIP was: pain and agitation    The interventions tried that have been unsuccessful at controlling symptoms include: sl morphine and ativan oral    Supporting documentation for GIP need for pain control:  [x] Frequent evaluation by a doctor, nurse practitioner, nurse   [x] Frequent medication adjustment    [x] IVs that cannot be administered at home   [x] Aggressive pain management   [] Complicated technical delivery of medications              Supporting documentation for GIP need for symptom control:  [x]  Sudden decline necessitating intensive nursing intervention  []  Uncontrolled / intractable nausea or vomiting   []  Pathological fractures  []  Advanced open wounds requiring frequent skilled care  [] Unmanageable respiratory distress  [x] New or worsening delirium   [] Delirium with behavior issues  [] Imminent death - with skilled nursing needs documented above

## 2017-08-27 NOTE — HOSPICE
NAME OF PATIENT:  Jose Street    LEVEL OF CARE:  GIP    REASON FOR GIP:   Pain, despite numerous changes in medications, Terminal agitation, despite changes to medications, Medication adjustment that must be monitored 24/7 and Stabilizing treatment that cannot take place at home    *PATIENT REMAINS ELIGIBLE FOR GIP LEVEL OF CARE AS EVIDENCED BY: (MUST BE ADDRESSED OF PATIENT GIP)  Pt is receiving PCA Dilaudid with PCA and basal rate,IVP Haldol and Ativan are being administered for terminal agitation. REASON FOR RESPITE:      O2 SAFETY:      FALL INTERVENTIONS PROVIDED:   Implemented/recommended use of non-skid footwear, Implemented/recommended use of fall risk identification flag to all team members, Implemented/recommended assistive devices and encouraged their use, Implemented/recommended resources for alarm system (personal alarm, bed alarm, call bell, etc.)  and Implemented/recommended environmental changes (remove hazards, lower bed, improve lighting, etc.)    INTERDISPLINARY COMMUNICATION/COLLABORATION:  Physician, MSW, [place] and RN, CNA    NEW MEDICATION INITIATION DOCUMENTATION:      Reason medication is being initiated:      MD / Provider name consulted re: change in status / initiation of new medication:      New Symptom(s):      New Order(s):      Name of the person notified of the changes:      Name of person being taught:      Instructions given:      Side Effects taught:      Response to teachin [address] free  Is Patient/family satisfied with symptom level?  yes    DISCHARGE PLAN:  Pt is actively dying @ [facility]     19:28,report received,assumed care of pt agitated and thrashing about in bed. Angella Resendiz and her 4 sisters are @ the bedside holding onto pt to comfort her. Scheduled dose of Ativan 2mg IVP given. 19:49,Ativan 2mg IVP,Haldol 2mg IVP given for continued agitation. Family is cooperative and wants to see pt less agitated. PCA Dilaudid infusing @ 1mg /hr with use of PCA doses of 0.5 mg,which can be used by RN. 20:30,Pt is calm,resp rate is 6 per minute but remains deep,skin is pale and warm,continue to assess frequently. 22:00,remains calm,resp. Status is unchanged,multiple family members @ bedside. 22:30,Robinul given for secretions. 00:00,scheduled Ativan IVP given,pt repositioned,increasing audible resp. Secretions noted. 02:00,Robinul given for secretions. 03:30,moving about in bed,saying\"Im cold\". Ativan given,repositioned. 03:45,still restless,Ativan 2mg IVP given,PCA pushed for comfort. 04:00,Haldol 2 mg IVP and Ativan 2mg IVP given for continued agitation. Pt with good cough effort,chest sounds less congested. 04:30,pt asleep now. 06:00,total of 6mg prn doses of Ativan,as well as 12 mg scheduled. Pt is asleep now.

## 2017-08-28 NOTE — HOSPICE
NAME OF PATIENT:  Lev Bone    LEVEL OF CARE:  GIP    REASON FOR GIP:   Pain, despite numerous changes in medications, Terminal agitation, despite changes to medications, Medication adjustment that must be monitored 24/7 and Stabilizing treatment that cannot take place at home    *PATIENT REMAINS ELIGIBLE FOR GIP LEVEL OF CARE AS EVIDENCED BY: (MUST BE ADDRESSED OF PATIENT GIP)  Pt is receiving every 2 hrs doses of IVP Dilaudid and Ativan for management of pain and terminal agitation. Pt has been receiving Robinul IVP via port for management of secretions    REASON FOR RESPITE:      O2 SAFETY:      FALL INTERVENTIONS PROVIDED:   Implemented/recommended use of non-skid footwear, Implemented/recommended use of fall risk identification flag to all team members, Implemented/recommended assistive devices and encouraged their use, Implemented/recommended resources for alarm system (personal alarm, bed alarm, call bell, etc.)  and Implemented/recommended environmental changes (remove hazards, lower bed, improve lighting, etc.)    INTERDISPLINARY COMMUNICATION/COLLABORATION:  Physician, MSW, [place] and RN, CNA    NEW MEDICATION INITIATION DOCUMENTATION:      Reason medication is being initiated:      MD / Provider name consulted re: change in status / initiation of new medication:      New Symptom(s):      New Order(s):      Name of the person notified of the changes:      Name of person being taught:      Instructions given:      Side Effects taught:      Response to teachin [address] free  Is Patient/family satisfied with symptom level?  yes    DISCHARGE PLAN:  Pt is actively dying @ [facility]    20:00,PCA Dilaudid has been dc'd due to lack of Medication syringes in [facility]. Dilaudid and Ativan IVP now being administered every 2 hrs via left chest port. Pt is unresponsive,no audible secretions noted. Skin is pale,warm,no mottling noted. Degroot intact with scant herb urine. Sisters @ bedside.   21:00,pt bathed by CNA. 22:00,scheduled meds given,pt remains unresponsive with shallow resp.  00:00,no changes noted. 02:00,scheduled Dilaudid and Ativan IVP given for management of pain and agitation,pt remains unresponsive. 04:00,resp rate is 8,HR is 90,scheduled Dilaudid and Ativan given for management os pain and agitation. 06:00,resp. Rate is 6,,given scheduled Dilaudid,but held Ativan. Pt did open her eyes and move her left arm. Atropine gtt given for audible secretions. 06:50,audible secretions worse,Robinul given.

## 2017-08-28 NOTE — HOSPICE
I met with Ms. Cartwright's sister. She requested  support. She described her sister, the patient, as being imminent. I arrived at the room and met with two family members in addition to the patient who was minimally responsive. I engaged the family members in a life review. I learned that Ms. Cartwright was originally from [facility]. Ms. Governor Castellanos was raised in the [facility] [place]. She moved to the [place] in order to be closer to her grand children. I shared the 23rd Psa and offered a prayer of hope and reassurance. I encouraged the family to notify the staff of their needs.

## 2017-08-28 NOTE — PROGRESS NOTES
[facility] SW/CH VISIT    [facility] Hospice Chaplain Initial Visit and Spiritual Assessment    I visited the room of this pt who is @ [facility] on GIP status. She was in bed, unresponsive, breathing softly  displaying a slightly irregular pattern. She was not agitated, not restless, and appeared well medicated. With her were her daughter, and several of her sisters and friends. They were quiet, keeping a bedoya. I introduced myself, said I had spoken with Steve Stewart and thus new of his visit earlier,  discussed my role as  within the care team and offered on-going pastoral care and spiritual support. Her daughter thanked me for my offer and said her mother had attended a [address] when she lived in [address] [place] [address], but had not been active in [employer] for years. [employer] when she lived in [address] [place] [address] but switched to the [facility] when she moved to [place]. They asked me to offer a prayer for \"Yajaira\" which I did and they thanked me for my visit. I gave her daughter my contact and reiterated our availability. This afternoon, I provided a ministry of presence, active listening to the family, offered words of comfort, assurance, spiritual encouragement, as well as offered a prayer. They asked me to visit whenever I could. GOALS:  Continue to visit this pt and her family,  to provide pastoral care and spiritual support to assist both the pt and them with coping with this decline. Build trust and familiarity with the family to encourage a discussion of their spiritual thoughts and concerns at a deeper and more personal level if they so choose. Validate the emotions and normalize the anticipatory grief of the family regarding this declining situation. Offer written spiritual material to the family as needed or requested and provide a listening presence when needed. PLAN:   Continue to visit this pt and her family, while she is @ [facility] and I am in this facility, or PRN as requested.    Meet with additional family when/if possible  Coordinate with Care Team on POC.  VISIT FREQUENCY:   1 wk. 1, 2 starting 8/28 plus 4 PRN 14 days.     [facility]

## 2017-08-28 NOTE — PROGRESS NOTES
[facility]  [phone]    Patient Name: Piter Del Castillo  YOB: 1950    Date of Provider Hospice Visit: 08/28/17    Level of Care:   [x] General Inpatient (GIP)    [] Routine   [] Respite    Location of Care:  [] [facility] [] [facility] [] [facility] [] [facility] [x] [facility]    Date of Original Hospice Admission: unclear but transferred to [facility] on 8/25/17  Hospice Medical Director for Inpatient Care: [address] [place] [address] Diagnosis: Lung cancer with mets to brain  Diagnoses RELATED to the terminal prognosis: Metastatic disease to brain, left frontal craniotomy with cerebral edema, right sided hemiplegia  Other Diagnoses:      Lyndsay Mary is a 74y.o. year old who was admitted to [address] [place] [address]. Patient has been under Hospice care at [address] [place] [address] for at least part of the month of August with [facility]. Have limited records but daughter(marisela) at bedside along with her sister and son-in-law. Patient dx with lung cancer in 2015 but then discovered to have metastatic disease to brain in July 2017. Underwent craniotomy at [facility] but had extensive edema and this had to be stopped. She then had gamma-knife early part of August and transferred to [facility]. Patient continued to decline and transitioned to Hospice care. Despite escalating doses of morphine and ativan, symptoms of pain/agiation unable to be controlled. Patient transferred to [facility] on evening of 8/25/17    The patient's principle diagnosis has resulted in no po intake, increasing pain and agitation      Functionally, the patient's Karnofsky and/or Palliative Performance Scale has declined over a period of weeks and is estimated at 20. The patient is dependent on the following ADLs:all    Objective information that support this patients limited prognosis includes: see note above. Do not have results of studies done at [facility] available. .      The patient/family chose comfort measures with the support of Hospice. HOSPICE DIAGNOSES   Active Symptoms:  1. Generalized pain: non verbal cues ++  2. Terminal agitation with moaning and crying out: ++ persistent  3. Unresponsiveness/delirium  4 Airway secretions +++     PLAN   1. GIP level of care will be continued as patient still requiring frequent nursing assessments and change in medications. .   2. Generalized pain-patient at first seemed to be improved on the dilaudid PCA of 1 mg/hr basal. She required 13 bolus dosing in last 24 hours. Patient with increase pain/moaning this morning. 3. Continue current medications with scheduled dilaudid and ativan every 2 hours  4. Add dilaudid 4mg Iv every 15mts as needed for pain  5. Reviewed plan with family-5 sisters and daughter at bedside. 6.  and SW to support family needs  7. Disposition: home if stabilizes but this appears unlikely; pt close to dying    Prognosis estimated based on 08/28/17 clinical assessment is:   [x] Hours to Days    [] Days to Weeks    [] Other:    Communicated plan of care with: Hospice Case Manager; Hospice IDT; Care Team     GOALS OF CARE     Resuscitation Status: DNR  Durable DNR: [x] Yes [] No    No flowsheet data found. HISTORY     History obtained from: chart, daughter, sisters, staff    CHIEF COMPLAINT: N/A  The patient is:   [] Verbal  [] Nonverbal  [x] Unresponsive    HPI/SUBJECTIVE:  Pt seen unresponsive and lethargic at this time but was very much agitated and in pain earlier in am today and received extra prn dilaudid. Received several prn doses of robinul, haldol and 1 prn dilaudid in addition to scheduled dilaudid and ativan every 2 hours.         REVIEW OF SYSTEMS     The following systems were: [] reviewed  [x] unable to be reviewed      Adult Non-Verbal Pain Assessment Score: 5/10    Face  [] 0   No particular expression or smile  [x] 1   Occasional grimace, tearing, frowning, wrinkled forehead  [] 2   Frequent grimace, tearing, frowning, wrinkled forehead    Activity (movement)  [] 0   Lying quietly, normal position  [x] 1   Seeking attention through movement or slow, cautious movement  [] 2   Restless, excessive activity and/or withdrawal reflexes    Guarding  [] 0   Lying quietly, no positioning of hands over areas of body  [x] 1   Splinting areas of the body, tense  [] 2   Rigid, stiff    Physiology (vital signs)  [] 0   Stable vital signs  [x] 1   Change in any of the following: SBP > 20mm Hg; HR > 20/minute  [] 2   Change in any of the following: SBP > 30mm Hg; HR > 25/minute    Respiratory  [] 0   Baseline RR/SpO2, compliant with ventilator  [x] 1   RR > 10 above baseline, or 5% drop SpO2, mild asynchrony with ventilator  [] 2   RR > 20 above baseline, or 10% drop SpO2, asynchrony with ventilator     FUNCTIONAL ASSESSMENT     Palliative Performance Scale (PPS):10%     PSYCHOSOCIAL/SPIRITUAL ASSESSMENT     Active Problems:    * No active hospital problems. *    No past medical history on file. No past surgical history on file. Social History   Substance Use Topics    Smoking status: Not on file    Smokeless tobacco: Not on file    Alcohol use Not on file     No family history on file.    Allergies   Allergen Reactions    Pcn [Penicillins] Unknown (comments)      Current Facility-Administered Medications   Medication Dose Route Frequency    HYDROmorphone (PF) (DILAUDID) injection 4 mg  4 mg IntraVENous Q15MIN PRN    LORazepam (ATIVAN) injection 4 mg  4 mg IntraVENous Q2H    atropine 1 % ophthalmic solution 1 Drop  1 Drop SubLINGual Q4H PRN    PHENobarbital (LUMINAL) injection 30 mg  30 mg IntraVENous Q6H PRN    ketorolac (TORADOL) injection 15 mg  15 mg IntraVENous Q6H PRN    HYDROmorphone (PF) (DILAUDID) injection 4 mg  4 mg IntraVENous Q2H    haloperidol lactate (HALDOL) injection 2 mg  2 mg IntraVENous Q4H PRN    LORazepam (ATIVAN) injection 2 mg  2 mg IntraVENous Q15MIN PRN    glycopyrrolate (ROBINUL) injection 0.2 mg  0.2 mg IntraVENous Q4H PRN    bisacodyl (DULCOLAX) suppository 10 mg  10 mg Rectal DAILY PRN    hyoscyamine SL (LEVSIN/SL) tablet 0.125 mg  0.125 mg SubLINGual Q4H PRN    prochlorperazine (COMPAZINE) tablet 10 mg  10 mg Oral Q6H PRN    acetaminophen (TYLENOL) suppository 650 mg  650 mg Rectal Q4H PRN        PHYSICAL EXAM     Wt Readings from Last 3 Encounters:   No data found for Wt       Visit Vitals    BP (!) 86/40    Pulse 96    Temp 98.7 °F (37.1 °C)    Resp (!) 6    SpO2 (!) 80%       Supplemental O2  [] Yes  [x] NO  Last bowel movement:     Currently this patient has:  [] Peripheral IV [] PICC  [x] PORT [] ICD    [x] Degroot Catheter [] NG Tube   [] PEG Tube    [] Rectal Tube [] Drain  [] Other:     Constitutional: unresponsive, lethargic, intermittent restlessness and agitation  Eyes: closed  ENMT: small laceration on forehead, covered with steristrips, airway secretions  Cardiovascular: tachycardic  Respiratory: not labored, scattered secretions  Gastrointestinal: soft, non tender, non distended  Musculoskeletal: muscle wasting  Skin: warm, dry  Neurologic: confused, agitated, restless, lethargic      Pertinent Lab and or Imaging Tests:  No results found for: NA, K, CL, CO2, AGAP, GLU, BUN, CREA, BUCR, GFRAA, GFRNA, CA, GFRAA  No results found for: TP, ALBR, TALB, ALB        Total time: 35mts  Counseling / coordination time: 15mts discussing with family; 4 sisters and daughter in room and hosp[facility] staff at [facility]  > 50% counseling / coordination?:     This patient meets Hospice General Inpatient (GIP) Level of Care.     The precipitating event that resulted in the need for GIP was: pain and agitation    The interventions tried that have been unsuccessful at controlling symptoms include: sl morphine and ativan oral    Supporting documentation for GIP need for pain control:  [x] Frequent evaluation by a doctor, nurse practitioner, nurse   [x] Frequent medication adjustment    [x] IVs that cannot be administered at home   [x] Aggressive pain management   [] Complicated technical delivery of medications              Supporting documentation for GIP need for symptom control:  [x]  Sudden decline necessitating intensive nursing intervention  []  Uncontrolled / intractable nausea or vomiting   []  Pathological fractures  []  Advanced open wounds requiring frequent skilled care  [] Unmanageable respiratory distress  [x] New or worsening delirium   [] Delirium with behavior issues  [] Imminent death - with skilled nursing needs documented above

## 2017-08-28 NOTE — PROGRESS NOTES
Report from Rosalina Dunn at 0700  0800 scheduled meds given for pain agitation. Sisters in room at bedisde, and aware client in dying process. 20 sec period of apnea noted. Course lung sounds (robinul recently given by previous shift)  Client appears comfortable with relaxed facial expression. Minimal use of accessory muscles to breath. Very warm to touch but temp 98.7 axillary. Lying on L side  1000  Scheduled Dilaudid given but held IV Lorazepam.  Client with family in room. Respirations unlabored and quieter  Turned on back- slight grimace with turn and resistance with movement/positioning of legs. Family in agreement with holding the ativan at this time. Advised them to call me with any concerns. Music playing in room  1100  Agitated moaning and advised sisters her back hurt. Scheduled lorazepam from 10 given. Now has 4 sisters present and DTR. Group tearful giving her permission to die  200  Called Dr Oliverio Wall to initiate PRN Dilaudid order received 4mg every 15 minutes PRN pain  1117  PRN dose given. Client resting quieter s/p dose  Mouth care given. Client on L side. Multiple family remain in room. [facility] DTR in room  Client appears without distress. Scheduled meds given as per MAR. Mouth care provided. Sisters and daughter talking about if they should leave or stay. Reminded to care for themselves. 1300 Family and client in room. Client with slow deep respirations  1410  2 sisters and DTR in room  Scheduled meds given as per MARS   Turned client to R side. Tolerated procedure with no apparent distress. Periods of apnea noted alternating with deep slow breaths  1500  Resting eyes closed. No apparent distress. DTR advising she was shaking her head a bit ago.   All agree she appears peaceful at present  1617  Meds given as per MARs  RR up to 20 even with slight use of neck muscles on inspiration  1840  [facility]  Sisters advise they will use [employer] [facility] [place] at time of death and dTR will be our first notification    NAME OF PATIENT:  Jim Stevens    LEVEL OF CARE:  GIP    REASON FOR GIP:   Pain, despite numerous changes in medications and Terminal agitation, despite changes to medications    *PATIENT REMAINS ELIGIBLE FOR GIP LEVEL OF CARE AS EVIDENCED BY: Frequent medications to stay on top of terminal agitation and pain      O2 SAFETY:  No Oxygen in place at this time    FALL INTERVENTIONS PROVIDED:   Implemented/recommended increased supervision/assistance    INTERDISPLINARY COMMUNICATION/COLLABORATION:  Physician, MSW, [place] and RN, CNA    NEW MEDICATION INITIATION DOCUMENTATION:  N/A at this time  CHAGO Gary added 11:15 2nd to awakening c/o pain with agitation after telephone order from Dr James Caraballo    Reason medication is being initiated:  N/A-Pain-specific c/o back pain    MD / Provider name consulted re: change in status / initiation of new medication:  Dr Britney Bacon Symptom(s):  N/A    New Order(s):  N/A    Name of the person notified of the changes:  DTR and sisters in room    Name of person being taught:  Sisters in room    Instructions given:  N/A    Side Effects taught:  N/A    Response to teaching:  N/A      COMFORTABLE DYING MEASURE:  Is Patient/family satisfied with symptom level?   Yes    DISCHARGE PLAN:  Will likely  at the hospice house

## 2017-08-29 NOTE — PROGRESS NOTES
0700  Report given. 0720  Pt lying in bed, eyes closed. No facial grimacing at this time. Lungs diminished. Pt is having increased secretions. HOB elevated. Pt's bowel sounds are absent. Abd is soft and nontender. Last BM is unknown. Pt has not eaten in several days. Degroot is draining yellow urine. Output is diminishing. No edema noted. Skin is intact. Left leg is cool to touch. Other ext are warm. VS 88/48, 89, 5, Sats 75% on RA Temp 99.6. [employer] Dials 0900  Pt resting. Family at the bedside. Respirations shallow with long periods of apnea,    1050  Pt continues to receive scheduled IV medications in order to maintain an acceptable comfort level. Rn continued to educate on the signs and symptoms of the dying process. 1200  CNA from [facility] in to give a bath. Rn assisted with turning and repositioning. 1230  Pt restless and moving her legs about in bed. Pt medicated with Lorazepam and Dilaudid. 1300  Pt resting. 1440  Pt medicated with scheduled Dilaudid and Lorazepam.  Family continues to be at the bedside. 1500  Pt resting. 1630  Pt medicated with Scheduled meds. Respirations shallow. Pt continues to have periods of apnea. Family at the bedside. [facility] RN also at the bedside. 1828  Pt medicated with scheduled meds. Respirations very shallow with periods of apnea. Toes mottling. Ext warm. No signs of distress. Sisters at the bedside. 1900  Report given.        NAME OF PATIENT:  Agnes Pond    LEVEL OF CARE:   GIP    REASON FOR GIP:   Pain, despite numerous changes in medications, Terminal agitation, despite changes to medications, Medication adjustment that must be monitored 24/7 and Stabilizing treatment that cannot take place at home    *PATIENT REMAINS ELIGIBLE FOR GIP LEVEL OF CARE AS EVIDENCED BY: (MUST BE ADDRESSED OF PATIENT GIP)  Pt continues to receive IV doses of Dialudid and Lorazepam scheduled every 2 hours to help maintain comfort level. O2 SAFETY:  RA    FALL INTERVENTIONS PROVIDED:   Implemented/recommended resources for alarm system (personal alarm, bed alarm, call bell, etc.) , Implemented/recommended environmental changes (remove hazards, lower bed, improve lighting, etc.) and Implemented/recommended increased supervision/assistance    INTERDISPLINARY COMMUNICATION/COLLABORATION:  Physician, MSW, [place] and RN, CNA    NEW MEDICATION INITIATION DOCUMENTATION:  No new medications initiated. COMFORTABLE DYING MEASURE:  Is Patient/family satisfied with symptom level?  yes    DISCHARGE PLAN:  Pt will remain at the [facility] until she passes away or until her family makes alternative living arrangements.

## 2017-08-29 NOTE — PROGRESS NOTES
Bedside and Verbal shift change report given to [facility] RN(oncoming nurse) by Moreno RN (offgoing nurse). Report included the following information SBAR, Kardex, Intake/Output and MAR. NAME OF PATIENT:  Patricia Dupree    LEVEL OF CARE:  GIP    REASON FOR GIP:       *PATIENT REMAINS ELIGIBLE FOR [facility] LEVEL OF CARE AS EVIDENCED BY: (MUST BE ADDRESSED OF PATIENT GIP)      REASON FOR RESPITE:  NA    O2 SAFETY:  NA    FALL INTERVENTIONS PROVIDED:   Implemented/recommended environmental changes (remove hazards, lower bed, improve lighting, etc.) and Implemented/recommended increased supervision/assistance    INTERDISPLINARY COMMUNICATION/COLLABORATION:  Physician, MSW, [place] and RN, CNA    NEW MEDICATION INITIATION DOCUMENTATION:  NA    Reason medication is being initiated:  NA    MD / Provider name consulted re: change in status / initiation of new medication:  NA    New Symptom(s):  NA    New Order(s):  NA    Name of the person notified of the changes: NA    Name of person being taught:  NA    Instructions given:  NA    Side Effects taught:  NA    Response to teaching:  NA      COMFORTABLE DYING MEASURE:  Is Patient/family satisfied with symptom level?  yes    DISCHARGE PLAN:  Remain at [facility] until end of life.

## 2017-08-29 NOTE — PROGRESS NOTES
Bedside and Verbal shift change report given to Isabelle Gregory 694 (oncoming nurse) by Kory Guerrero RN (offgoing nurse). Report included the following information SBAR, Kardex, Intake/Output and MAR. NAME OF PATIENT:  Lita Wilde    LEVEL OF CARE: GIP    REASON FOR GIP:   Pain, despite numerous changes in medications, Terminal agitation, despite changes to medications and Stabilizing treatment that cannot take place at home    *PATIENT REMAINS ELIGIBLE FOR GIP LEVEL OF CARE AS EVIDENCED BY: (MUST BE ADDRESSED OF PATIENT GIP)      REASON FOR RESPITE:  NA    O2 SAFETY:  NA    FALL INTERVENTIONS PROVIDED:   NA    INTERDISPLINARY COMMUNICATION/COLLABORATION:  Physician, MSW, [place] and RN, CNA    NEW MEDICATION INITIATION DOCUMENTATION:  NA    Reason medication is being initiated:  ISABELLE    MD / Provider name consulted re: change in status / initiation of new medication:  NA    New Symptom(s):  NA    New Order(s):  NA    Name of the person notified of the changes:  NA    Name of person being taught: NA    Instructions given:  NA    Side Effects taught:  ISABELLE    Response to teaching:  NA      COMFORTABLE DYING MEASURE:  Is Patient/family satisfied with symptom level?  yes    DISCHARGE PLAN:  Remain at [facility] until end of life. 20:15 Patient lying in bed unresponsive to pain or verbal stimuli, shallow breathing with periods of apnea. Skin pale warm to touch. Degroot patent draining herb urine, Medicated with scheduled Dilaudid and Lorazepam, no signs of pain or agitation noted. Family at bedsde, emotional support provided. 22:15 Patient turned and repositioned for comfort, medicated with scheduled Dilaudid and Lorazepam, no signs of pain or agitation noted, appears comfortable. Family remains at bedside. 00:00 Patient medicated with scheduled Dilaudid and Lorazepam, no signs of pain or agitation noted, resting quietly.   02:15 Patient repositioned for comfort, mouth care provided, medicated with scheduled Dilaudid and Lorazepam, no sins of pain or agitation noted. Family remains at bedside. 04:15 Patient medicated with estelle Dilaudid and Lorazepam no signs of pain or distress noted. 06:00 Patient turned and repositioned for comfort, medicated with scheduled Dilaudid and Lorazepam, no signs of pain or agitation noted, resting quietly. Family remains at bedside.

## 2017-08-29 NOTE — PROGRESS NOTES
[facility] Help to Those in Need  [phone]    Patient Name: Octavio Aguillon  YOB: 1950    Date of Provider Hospice Visit: 08/29/17    Level of Care:   [x] General Inpatient (GIP)    [] Routine   [] Respite    Location of Care:  [] [facility] [] [facility] [] [address] [] [facility] [x] Roberto Taveras [facility]    Date of Original Hospice Admission: unclear but transferred to [facility] on 8/25/17  Hospice Medical Director for Inpatient Care: [address] [place] [address] Diagnosis: Lung cancer with mets to brain  Diagnoses RELATED to the terminal prognosis: Metastatic disease to brain, left frontal craniotomy with cerebral edema, right sided hemiplegia  Other Diagnoses:      Lis Meeks is a 74y.o. year old who was admitted to [facility]. Patient has been under Hospice care at [address] [place] [address] for at least part of the month of August with [facility]. Have limited records but daughter(marisela) at bedside along with her sister and son-in-law. Patient dx with lung cancer in 2015 but then discovered to have metastatic disease to brain in July 2017. Underwent craniotomy at [facility] but had extensive edema and this had to be stopped. She then had gamma-knife early part of August and transferred to [facility]. Patient continued to decline and transitioned to Hospice care. Despite escalating doses of morphine and ativan, symptoms of pain/agiation unable to be controlled. Patient transferred to [facility] on evening of 8/25/17    The patient's principle diagnosis has resulted in no po intake, increasing pain and agitation      Functionally, the patient's Karnofsky and/or Palliative Performance Scale has declined over a period of weeks and is estimated at 20. The patient is dependent on the following ADLs:all    Objective information that support this patients limited prognosis includes: see note above. Do not have results of studies done at [facility] available. .      The patient/family chose comfort measures with the support of Hospice. HOSPICE DIAGNOSES   Active Symptoms:  1. Generalized pain: non verbal cues ++  2. Terminal agitation with moaning and crying out: has been persistent but at this moment she is quiet, requires frequent intervention from nursing and observation  3. Unresponsiveness/delirium  4 Airway secretions +++     PLAN   1. GIP level of care will be continued as patient still requiring frequent nursing assessments and change in medications. .   2. Generalized pain-patient at first seemed to be improved on the dilaudid PCA of 1 mg/hr basal. She required 13 bolus dosing in last 24 hours. Patient with increase pain/moaning this morning. 3. Continue current medications with scheduled dilaudid and ativan every 2 hours which she has needed around the clock  4. Continue dilaudid 4mg Iv every 2 hours around the clock   5. Reviewed plan with family-5 sisters and daughter at bedside. 6. Robinul as needed for airway secretions    7.  and SW to support family needs  8. Disposition: home if stabilizes but this appears unlikely; pt close to dying    Prognosis estimated based on 08/29/17 clinical assessment is:   [x] Hours to Days    [] Days to Weeks    [] Other:    Communicated plan of care with: Hospice Case Manager; Hospice IDT; Care Team     GOALS OF CARE     Resuscitation Status: DNR  Durable DNR: [x] Yes [] No    No flowsheet data found.      HISTORY     History obtained from: chart, daughter, sisters, staff    CHIEF COMPLAINT: N/A  The patient is:   [] Verbal  [] Nonverbal  [x] Unresponsive    HPI/SUBJECTIVE:  Pt seen unresponsive and lethargic at this time but continues to require dilaudid and ativan every 2hours around the clock  Received several prn doses of robinul, haldol and 1 prn dilaudid in addition to scheduled dilaudid and ativan every 2 hours in the past 24-48 hours        REVIEW OF SYSTEMS     The following systems were: [] reviewed  [x] unable to be reviewed      Adult Non-Verbal Pain Assessment Score: 5/10    Face  [] 0   No particular expression or smile  [x] 1   Occasional grimace, tearing, frowning, wrinkled forehead  [] 2   Frequent grimace, tearing, frowning, wrinkled forehead    Activity (movement)  [x] 0   Lying quietly, normal position  [] 1   Seeking attention through movement or slow, cautious movement  [] 2   Restless, excessive activity and/or withdrawal reflexes    Guarding  [] 0   Lying quietly, no positioning of hands over areas of body  [] 1   Splinting areas of the body, tense  [x] 2   Rigid, stiff    Physiology (vital signs)  [] 0   Stable vital signs  [x] 1   Change in any of the following: SBP > 20mm Hg; HR > 20/minute  [] 2   Change in any of the following: SBP > 30mm Hg; HR > 25/minute    Respiratory  [] 0   Baseline RR/SpO2, compliant with ventilator  [x] 1   RR > 10 above baseline, or 5% drop SpO2, mild asynchrony with ventilator  [] 2   RR > 20 above baseline, or 10% drop SpO2, asynchrony with ventilator     FUNCTIONAL ASSESSMENT     Palliative Performance Scale (PPS):10%     PSYCHOSOCIAL/SPIRITUAL ASSESSMENT     Active Problems:    * No active hospital problems. *    No past medical history on file. No past surgical history on file. Social History   Substance Use Topics    Smoking status: Not on file    Smokeless tobacco: Not on file    Alcohol use Not on file     No family history on file.    Allergies   Allergen Reactions    Pcn [Penicillins] Unknown (comments)      Current Facility-Administered Medications   Medication Dose Route Frequency    atropine 1 % ophthalmic solution 3 Drop  3 Drop SubLINGual Q4H PRN    HYDROmorphone (PF) (DILAUDID) injection 4 mg  4 mg IntraVENous Q15MIN PRN    LORazepam (ATIVAN) injection 4 mg  4 mg IntraVENous Q2H    PHENobarbital (LUMINAL) injection 30 mg  30 mg IntraVENous Q6H PRN    ketorolac (TORADOL) injection 15 mg  15 mg IntraVENous Q6H PRN    HYDROmorphone (PF) (DILAUDID) injection 4 mg  4 mg IntraVENous Q2H    haloperidol lactate (HALDOL) injection 2 mg  2 mg IntraVENous Q4H PRN    LORazepam (ATIVAN) injection 2 mg  2 mg IntraVENous Q15MIN PRN    glycopyrrolate (ROBINUL) injection 0.2 mg  0.2 mg IntraVENous Q4H PRN    bisacodyl (DULCOLAX) suppository 10 mg  10 mg Rectal DAILY PRN    hyoscyamine SL (LEVSIN/SL) tablet 0.125 mg  0.125 mg SubLINGual Q4H PRN    prochlorperazine (COMPAZINE) tablet 10 mg  10 mg Oral Q6H PRN    acetaminophen (TYLENOL) suppository 650 mg  650 mg Rectal Q4H PRN        PHYSICAL EXAM     Wt Readings from Last 3 Encounters:   No data found for Wt       Visit Vitals    BP (!) 86/40    Pulse 96    Temp 98.7 °F (37.1 °C)    Resp (!) 6    SpO2 (!) 80%       Supplemental O2  [] Yes  [x] NO  Last bowel movement:     Currently this patient has:  [] Peripheral IV [] PICC  [x] PORT [] ICD    [x] Degroot Catheter [] NG Tube   [] PEG Tube    [] Rectal Tube [] Drain  [] Other:     Constitutional: unresponsive, lethargic, intermittent restlessness and agitation  Eyes: closed  ENMT: small laceration on forehead, covered with steristrips, airway secretions  Cardiovascular: tachycardic  Respiratory: not labored, scattered secretions  Gastrointestinal: soft, non tender, non distended  Musculoskeletal: muscle wasting  Skin: warm, dry  Neurologic: confused, agitated, restless, lethargic      Pertinent Lab and or Imaging Tests:  No results found for: NA, K, CL, CO2, AGAP, GLU, BUN, CREA, BUCR, GFRAA, GFRNA, CA, GFRAA  No results found for: TP, ALBR, TALB, ALB        Total time: 25mts      This patient meets Hospice General Inpatient (GIP) Level of Care.     The precipitating event that resulted in the need for GIP was: pain and agitation    The interventions tried that have been unsuccessful at controlling symptoms include: sl morphine and ativan oral    Supporting documentation for GIP need for pain control:  [x] Frequent evaluation by a doctor, nurse practitioner, nurse   [x] Frequent medication adjustment    [x] IVs that cannot be administered at home   [x] Aggressive pain management   [] Complicated technical delivery of medications              Supporting documentation for GIP need for symptom control:  [x]  Sudden decline necessitating intensive nursing intervention  []  Uncontrolled / intractable nausea or vomiting   []  Pathological fractures  []  Advanced open wounds requiring frequent skilled care  [] Unmanageable respiratory distress  [x] New or worsening delirium   [] Delirium with behavior issues  [x] Imminent death - with skilled nursing needs documented above    Demarcus Campbell, NP

## 2017-08-30 NOTE — PROGRESS NOTES
0700 Recd report from [address], pt remains GIP level of care for pain and agitation  0800 Scheduled meds Dilaudid 4mg and Ativan 4mg given at this time. Appears comfortable. Turned and repositioned, mouth care done. PRN Robinul given  1000 Scheduled  Diludid and ativan meds pt is comfortable  1200 Scheduled  Diludid and ativan meds pt is comfortable  Repositioned and mouth care  1400 Scheduled  Diludid and ativan meds pt is comfortable  1600 Scheduled  Diludid and ativan meds pt is comfortable  1800 Scheduled  Diludid and ativan meds pt is comfortable  Family have refused a bed bath today. 1900 report to oncoming shift. Robinul given prn x 2 and atropine x 1 effectivew    NAME OF PATIENT:  Linette Cartwright      LEVEL OF CARE:  GIP      REASON FOR GIP:   Pain, despite numerous changes in medications, Terminal agitation, despite changes to medications, Medication adjustment that must be monitored 24/7 and Stabilizing treatment that cannot take place at home      *PATIENT REMAINS ELIGIBLE FOR GIP LEVEL OF CARE AS EVIDENCED BY: (MUST BE ADDRESSED OF PATIENT GIP) Medications were changed during the night to intravenous via port.  Patient continues with increased agitation this am          REASON FOR RESPITE:  na      O2 SAFETY:  na      FALL INTERVENTIONS PROVIDED:   Implemented/recommended use of non-skid footwear, Implemented/recommended use of fall risk identification flag to all team members, Implemented/recommended resources for alarm system (personal alarm, bed alarm, call bell, etc.) , Implemented/recommended environmental changes (remove hazards, lower bed, improve lighting, etc.) and Implemented/recommended increased supervision/assistance      INTERDISPLINARY COMMUNICATION/COLLABORATION:  Physician, MSW, Marley and RN, CNA      NEW MEDICATION INITIATION DOCUMENTATION:  Consulted AT MD to report change in pt status, Obtained Order from Provider for initiation of symptom relief medication /other medication needed and Documentation completed in Clinical Note in [address]  Reason medication is being initiated: Pain and agitation not managed      MD / Provider name consulted re: change in status / initiation of new medication:  Dr Perico Meraz Symptom(s):   Agitation, Terminal Restlessness, increased pain      New Order(s):  Increased dosages and routes of medications ativan and dilaudid, added toradol, phenobarbital, atropine      Name of the person notified of the changes:  Sister      Name of person being taught:  Sister      Instructions given:  increased medications will help control terminal agitation, increased dilaudid for yelling out pain,       Side Effects taught:  lethargy, possible apnea, fever      Response to teaching:  verbalized understanding          COMFORTABLE DYING MEASURE:  Is Patient/family satisfied with symptom level?  yes      DISCHARGE PLAN:  EOL at [facility]

## 2017-08-30 NOTE — PROGRESS NOTES
Bedside and Verbal shift change report given to Isabelle Corrigan (oncoming nurse) by Julissa Clarke (offgoing nurse). Report included the following information SBAR, Kardex, Intake/Output and MAR. NAME OF PATIENT:  Roshni Cui    LEVEL OF CARE: [facility]    REASON FOR GIP:   Pain, despite numerous changes in medications, Terminal agitation, despite changes to medications, Medication adjustment that must be monitored 24/7 and Stabilizing treatment that cannot take place at home    *PATIENT REMAINS ELIGIBLE FOR [facility] LEVEL OF CARE AS EVIDENCED BY: (MUST BE ADDRESSED OF PATIENT GIP)      REASON FOR RESPITE:  NA    O2 SAFETY:  NA    FALL INTERVENTIONS PROVIDED:   Implemented/recommended resources for alarm system (personal alarm, bed alarm, call bell, etc.)  and Implemented/recommended environmental changes (remove hazards, lower bed, improve lighting, etc.)    INTERDISPLINARY COMMUNICATION/COLLABORATION:  Physician, MSW, Marley and RN, CNA    NEW MEDICATION INITIATION DOCUMENTATION:  NA    Reason medication is being initiated:  ISABELLE    MD / Provider name consulted re: change in status / initiation of new medication:  NA    New Symptom(s):  NA    New Order(s):  NA    Name of the person notified of the changes:  NA    Name of person being taught:  NA    Instructions given:  NA    Side Effects taught:  NA    Response to teaching:  NA      COMFORTABLE DYING MEASURE:  Is Patient/family satisfied with symptom level?  yes    DISCHARGE PLAN:  Remain at [facility] until end of life. 20:00 Patient lying in bed unresponsive to pain or verbal stimuli, shallow breathing with long periods of apnea, Skin remains intact warm to touch, jefferson catheter patent draining small amt herb urine. Turned and repositioned for comfort, medicated with estelle Dilaudid and Lorazepam via  PAC. Family remains at bedside. 22:00 Patient resting quietly, medicated with scheduled Dilaudid and Lorazepam, no signs of pain or agitation noted.   00:10 Patient medicated with scheduled Dilaudid and Lorazepam, resting quietly, no signs of pain or agitation noted, mouth care provided, appears comfortable. 02:15 Patient resting quietly, medicated with scheduled Lorazepam and Dilaudid, no signs of pain or agitation noted. Family remains at bedside. 04:15 Patient turned and repositioned for comfort, medicated with estelle Dilaudid and Lorazepam, resting quietly. 06:20 Medicated with estelle Dilaudid and Lorazepam, turned and repositioned for comfort, mouth care provided, appears comfortable. Family at bedside, emotional support provided.

## 2017-08-30 NOTE — PROGRESS NOTES
[facility] in [facility]  [phone]    Patient Name: Radha Salazar  YOB: 1950    Date of Provider Hospice Visit: 08/30/17    Level of Care:   [x] General Inpatient (GIP)    [] Routine   [] Respite    Location of Care:  [] [facility] [] [facility] [] [facility] [] [facility] [x] Roberto Taveras [address] [facility]    Date of Original Hospice Admission: unclear but transferred to [facility] on 8/25/17  Hospice Medical Director for Inpatient Care: [address] [place] [address] Diagnosis: Lung cancer with mets to brain  Diagnoses RELATED to the terminal prognosis: Metastatic disease to brain, left frontal craniotomy with cerebral edema, right sided hemiplegia  Other Diagnoses:      Babita Johns is a 74y.o. year old who was admitted to [facility]. Patient has been under Hospice care at [address] [place] [address] for at least part of the month of August with [facility]. Have limited records but daughter(marisela) at bedside along with her sister and son-in-law. Patient dx with lung cancer in 2015 but then discovered to have metastatic disease to brain in July 2017. Underwent craniotomy at [facility] but had extensive edema and this had to be stopped. She then had gamma-knife early part of August and transferred to [facility]. Patient continued to decline and transitioned to Hospice care. Despite escalating doses of morphine and ativan, symptoms of pain/agiation unable to be controlled. Patient transferred to [facility] on evening of 8/25/17    The patient's principle diagnosis has resulted in no po intake, increasing pain and agitation      Functionally, the patient's Karnofsky and/or Palliative Performance Scale has declined over a period of weeks and is estimated at 20. The patient is dependent on the following ADLs:all    Objective information that support this patients limited prognosis includes: see note above. Do not have results of studies done at [facility] available. .      The patient/family chose comfort measures with the support of Hospice. HOSPICE DIAGNOSES   Active Symptoms:  1. Generalized pain: non verbal cues ++  2. Terminal agitation with moaning and crying out: has been persistent but at this moment she is quiet, requires frequent intervention from nursing and observation  3. Unresponsiveness/delirium  4 Airway secretions +++     PLAN   1. GIP level of care will be continued as patient still requiring frequent nursing assessments and change in medications. .   2. Generalized pain-patient at first seemed to be improved on the dilaudid PCA of 1 mg/hr basal. She required 13 bolus dosing in last 24 hours. Patient continues with  pain/moaning between dosing. 3. Continue current medications with scheduled dilaudid and ativan every 2 hours which she has needed around the clock  4. Continue dilaudid 4mg Iv every 2 hours around the clock 12 doses/day and will continue this dosing  5. Reviewed plan with family at bedside, met with primary nurse from [facility] KASANDRA  6. Robinul as needed for airway secretions  7. Now with a temp of 100.7, tylenol suppositories    8.  and SW to support family needs  9. Disposition: home if stabilizes but this appears unlikely; pt close to dying    Prognosis estimated based on 08/30/17 clinical assessment is:   [x] Hours to Days    [] Days to Weeks    [] Other:    Communicated plan of care with: Hospice Case Manager; Hospice IDT; Care Team     GOALS OF CARE     Resuscitation Status: DNR  Durable DNR: [x] Yes [] No    No flowsheet data found.      HISTORY     History obtained from: chart, daughter, sisters, staff    CHIEF COMPLAINT: N/A  The patient is:   [] Verbal  [] Nonverbal  [x] Unresponsive    HPI/SUBJECTIVE:  Pt seen unresponsive and lethargic at this time but continues to require dilaudid and ativan every 2hours around the clock  Received several prn doses of robinul thus far today       REVIEW OF SYSTEMS     The following systems were: [] reviewed  [x] unable to be reviewed      Adult Non-Verbal Pain Assessment Score: 5/10    Face  [] 0   No particular expression or smile  [x] 1   Occasional grimace, tearing, frowning, wrinkled forehead  [] 2   Frequent grimace, tearing, frowning, wrinkled forehead    Activity (movement)  [x] 0   Lying quietly, normal position  [] 1   Seeking attention through movement or slow, cautious movement  [] 2   Restless, excessive activity and/or withdrawal reflexes    Guarding  [] 0   Lying quietly, no positioning of hands over areas of body  [] 1   Splinting areas of the body, tense  [x] 2   Rigid, stiff    Physiology (vital signs)  [x] 0   Stable vital signs  [] 1   Change in any of the following: SBP > 20mm Hg; HR > 20/minute  [] 2   Change in any of the following: SBP > 30mm Hg; HR > 25/minute    Respiratory  [] 0   Baseline RR/SpO2, compliant with ventilator  [] 1   RR > 10 above baseline, or 5% drop SpO2, mild asynchrony with ventilator  [x] 2   RR > 20 above baseline, or 10% drop SpO2, asynchrony with ventilator     FUNCTIONAL ASSESSMENT     Palliative Performance Scale (PPS):10%     PSYCHOSOCIAL/SPIRITUAL ASSESSMENT     Active Problems:    * No active hospital problems. *    No past medical history on file. No past surgical history on file. Social History   Substance Use Topics    Smoking status: Not on file    Smokeless tobacco: Not on file    Alcohol use Not on file     No family history on file.    Allergies   Allergen Reactions    Pcn [Penicillins] Unknown (comments)      Current Facility-Administered Medications   Medication Dose Route Frequency    glycopyrrolate (ROBINUL) injection 0.2 mg  0.2 mg IntraVENous Q4H    glycopyrrolate (ROBINUL) injection 0.2 mg  0.2 mg IntraVENous Q2H PRN    atropine 1 % ophthalmic solution 3 Drop  3 Drop SubLINGual Q4H PRN    HYDROmorphone (PF) (DILAUDID) injection 4 mg  4 mg IntraVENous Q15MIN PRN    LORazepam (ATIVAN) injection 4 mg  4 mg IntraVENous Q2H    PHENobarbital (LUMINAL) injection 30 mg  30 mg IntraVENous Q6H PRN    ketorolac (TORADOL) injection 15 mg  15 mg IntraVENous Q6H PRN    HYDROmorphone (PF) (DILAUDID) injection 4 mg  4 mg IntraVENous Q2H    haloperidol lactate (HALDOL) injection 2 mg  2 mg IntraVENous Q4H PRN    LORazepam (ATIVAN) injection 2 mg  2 mg IntraVENous Q15MIN PRN    bisacodyl (DULCOLAX) suppository 10 mg  10 mg Rectal DAILY PRN    prochlorperazine (COMPAZINE) tablet 10 mg  10 mg Oral Q6H PRN    acetaminophen (TYLENOL) suppository 650 mg  650 mg Rectal Q4H PRN        PHYSICAL EXAM     Wt Readings from Last 3 Encounters:   No data found for Wt       Visit Vitals    /52 (BP 1 Location: Left arm)    Pulse 99    Temp (!) 100.7 °F (38.2 °C)    Resp 11    SpO2 (!) 79%       Supplemental O2  [] Yes  [x] NO  Last bowel movement:     Currently this patient has:  [] Peripheral IV [] PICC  [x] PORT [] ICD    [x] Degroot Catheter [] NG Tube   [] PEG Tube    [] Rectal Tube [] Drain  [] Other:     Constitutional: unresponsive, lethargic, intermittent restlessness and agitation  Eyes: closed  ENMT: small laceration on forehead, covered with steristrips, airway secretions  Cardiovascular: tachycardic  Respiratory: not labored, scattered secretions  Gastrointestinal: soft, non tender, non distended  Musculoskeletal: muscle wasting  Skin: warm, dry  Neurologic: confused, agitated, restless, lethargic      Pertinent Lab and or Imaging Tests:  No results found for: NA, K, CL, CO2, AGAP, GLU, BUN, CREA, BUCR, GFRAA, GFRNA, CA, GFRAA  No results found for: TP, ALBR, TALB, ALB        Total time: 25mts      This patient meets Hospice General Inpatient (GIP) Level of Care.     The precipitating event that resulted in the need for GIP was: pain and agitation    The interventions tried that have been unsuccessful at controlling symptoms include: sl morphine and ativan oral    Supporting documentation for GIP need for pain control:  [x] Frequent evaluation by a doctor, nurse practitioner, nurse   [x] Frequent medication adjustment    [x] IVs that cannot be administered at home   [x] Aggressive pain management   [] Complicated technical delivery of medications              Supporting documentation for GIP need for symptom control:  [x]  Sudden decline necessitating intensive nursing intervention  []  Uncontrolled / intractable nausea or vomiting   []  Pathological fractures  []  Advanced open wounds requiring frequent skilled care  [] Unmanageable respiratory distress  [x] New or worsening delirium   [] Delirium with behavior issues  [x] Imminent death - with skilled nursing needs documented above    Tito Murphy MD MD Addendum    Pt seen & examined, case discussed with Ms. Courtney Gasca. Agree with current care plan as outlined in her note. Recommend following changes:  Pt with increased secretions and gurgling causing discomfort and restlessness: scheduled robinul every 4 hours and every 2 hours as needed for secretions. Will continue to follow for comfort and adjust medications/care plan accordingly.

## 2017-08-31 NOTE — PROGRESS NOTES
[facility] Help to Those in Need  [phone]    Patient Name: William Palomo  YOB: 1950    Date of Provider Hospice Visit: 08/31/17    Level of Care:   [x] General Inpatient (GIP)    [] Routine   [] Respite    Location of Care:  [] [facility] [] [facility] [] [address] [] [facility] [x] Roberto Taveras [facility]    Date of Original Hospice Admission: unclear but transferred to [facility] on 8/25/17  Hospice Medical Director for Inpatient Care: [address] [place] [address] Diagnosis: Lung cancer with mets to brain  Diagnoses RELATED to the terminal prognosis: Metastatic disease to brain, left frontal craniotomy with cerebral edema, right sided hemiplegia  Other Diagnoses:      Stevan Echavarria is a 74y.o. year old who was admitted to [facility]. Patient has been under Hospice care at [address] [place] [address] for at least part of the month of August with [facility]. Have limited records but daughter(marisela) at bedside along with her sister and son-in-law. Patient dx with lung cancer in 2015 but then discovered to have metastatic disease to brain in July 2017. Underwent craniotomy at [facility] but had extensive edema and this had to be stopped. She then had gamma-knife early part of August and transferred to [facility]. Patient continued to decline and transitioned to Hospice care. Despite escalating doses of morphine and ativan, symptoms of pain/agiation unable to be controlled. Patient transferred to [facility] on evening of 8/25/17    The patient's principle diagnosis has resulted in no po intake, increasing pain and agitation      Functionally, the patient's Karnofsky and/or Palliative Performance Scale has declined over a period of weeks and is estimated at 20. The patient is dependent on the following ADLs:all    Objective information that support this patients limited prognosis includes: see note above. Do not have results of studies done at [facility] available. .      The patient/family chose comfort measures with the support of Hospice. HOSPICE DIAGNOSES   Active Symptoms:  1. Generalized pain: non verbal cues ++  2. Terminal agitation with moaning and crying out: has been persistent but at this moment she is quiet, requires frequent intervention from nursing and observation  3. Unresponsiveness/delirium  4 Airway secretions +++     PLAN   1. Continue GIP as patient still requiring frequent nursing assessments and change in medications. .   2. Generalized pain-patient at first seemed to be improved on the dilaudid PCA of 1 mg/hr basal. She required 13 bolus dosing in last 24 hours. Patient continues with  pain/moaning between dosing. 3. Continue current medications with scheduled dilaudid and ativan every 2 hours which she has needed around the clock  4. Continue dilaudid 4mg Iv every 2 hours around the clock 12 doses/day and will continue this dosing  5. Reviewed plan with family at bedside, met with primary nurse from [facility] KASANDRA  6. Robinul as needed for airway secretions and scheduled every 4 hours  7. Now with a temp of 102., tylenol suppositories    8.  and SW to support family needs, met with family, they are tearful, aware of her status, death is imminent   5. Disposition: home if stabilizes but this appears unlikely; pt close to dying    Prognosis estimated based on 08/31/17 clinical assessment is:   [x] Hours to Days    [] Days to Weeks    [] Other:    Communicated plan of care with: Hospice Case Manager; Hospice IDT; Care Team     GOALS OF CARE     Resuscitation Status: DNR  Durable DNR: [x] Yes [] No    No flowsheet data found.      HISTORY     History obtained from: chart, daughter, sisters, staff    CHIEF COMPLAINT: N/A  The patient is:   [] Verbal  [] Nonverbal  [x] Unresponsive    HPI/SUBJECTIVE:  Pt seen unresponsive and lethargic at this time but continues to require dilaudid and ativan every 2hours around the clock  Received several prn doses of robinul thus far today REVIEW OF SYSTEMS     The following systems were: [] reviewed  [x] unable to be reviewed      Adult Non-Verbal Pain Assessment Score: 7/10    Face  [] 0   No particular expression or smile  [x] 1   Occasional grimace, tearing, frowning, wrinkled forehead  [] 2   Frequent grimace, tearing, frowning, wrinkled forehead    Activity (movement)  [x] 0   Lying quietly, normal position  [] 1   Seeking attention through movement or slow, cautious movement  [] 2   Restless, excessive activity and/or withdrawal reflexes    Guarding  [] 0   Lying quietly, no positioning of hands over areas of body  [] 1   Splinting areas of the body, tense  [x] 2   Rigid, stiff    Physiology (vital signs)  [] 0   Stable vital signs  [] 1   Change in any of the following: SBP > 20mm Hg; HR > 20/minute  [x] 2   Change in any of the following: SBP > 30mm Hg; HR > 25/minute    Respiratory  [] 0   Baseline RR/SpO2, compliant with ventilator  [] 1   RR > 10 above baseline, or 5% drop SpO2, mild asynchrony with ventilator  [x] 2   RR > 20 above baseline, or 10% drop SpO2, asynchrony with ventilator     FUNCTIONAL ASSESSMENT     Palliative Performance Scale (PPS):10%     PSYCHOSOCIAL/SPIRITUAL ASSESSMENT     Active Problems:    * No active hospital problems. *    No past medical history on file. No past surgical history on file. Social History   Substance Use Topics    Smoking status: Not on file    Smokeless tobacco: Not on file    Alcohol use Not on file     No family history on file.    Allergies   Allergen Reactions    Pcn [Penicillins] Unknown (comments)      Current Facility-Administered Medications   Medication Dose Route Frequency    glycopyrrolate (ROBINUL) injection 0.2 mg  0.2 mg IntraVENous Q4H    glycopyrrolate (ROBINUL) injection 0.2 mg  0.2 mg IntraVENous Q2H PRN    atropine 1 % ophthalmic solution 3 Drop  3 Drop SubLINGual Q4H PRN    HYDROmorphone (PF) (DILAUDID) injection 4 mg  4 mg IntraVENous Q15MIN PRN    LORazepam (ATIVAN) injection 4 mg  4 mg IntraVENous Q2H    PHENobarbital (LUMINAL) injection 30 mg  30 mg IntraVENous Q6H PRN    ketorolac (TORADOL) injection 15 mg  15 mg IntraVENous Q6H PRN    HYDROmorphone (PF) (DILAUDID) injection 4 mg  4 mg IntraVENous Q2H    haloperidol lactate (HALDOL) injection 2 mg  2 mg IntraVENous Q4H PRN    LORazepam (ATIVAN) injection 2 mg  2 mg IntraVENous Q15MIN PRN    bisacodyl (DULCOLAX) suppository 10 mg  10 mg Rectal DAILY PRN    prochlorperazine (COMPAZINE) tablet 10 mg  10 mg Oral Q6H PRN    acetaminophen (TYLENOL) suppository 650 mg  650 mg Rectal Q4H PRN        PHYSICAL EXAM     Wt Readings from Last 3 Encounters:   No data found for Wt       Visit Vitals    BP (!) 72/20    Pulse (!) 128    Temp (!) 102 °F (38.9 °C)    Resp 12    SpO2 (!) 55%       Supplemental O2  [] Yes  [x] NO  Last bowel movement:     Currently this patient has:  [] Peripheral IV [] PICC  [x] PORT [] ICD    [x] Degroot Catheter [] NG Tube   [] PEG Tube    [] Rectal Tube [] Drain  [] Other:     Constitutional: unresponsive, lethargic, intermittent restlessness and agitation  Eyes: closed  ENMT: small laceration on forehead, covered with steristrips, airway secretions  Cardiovascular: tachycardic  Respiratory: not labored, scattered secretions  Gastrointestinal: soft, non tender, non distended  Musculoskeletal: muscle wasting  Skin: warm, dry  Neurologic: confused, agitated, restless, lethargic      Pertinent Lab and or Imaging Tests:  No results found for: NA, K, CL, CO2, AGAP, GLU, BUN, CREA, BUCR, GFRAA, GFRNA, CA, GFRAA  No results found for: TP, ALBR, TALB, ALB        Total time: 45      This patient meets Hospice General Inpatient (GIP) Level of Care. pt is moribund and actively dying, still moaning and graoning occurs when moved and intermittent     The precipitating event that resulted in the need for GIP was: pain and agitation    The interventions tried that have been unsuccessful at controlling symptoms include: sl morphine and ativan oral    Supporting documentation for GIP need for pain control:  [x] Frequent evaluation by a doctor, nurse practitioner, nurse   [x] Frequent medication adjustment    [x] IVs that cannot be administered at home   [x] Aggressive pain management   [] Complicated technical delivery of medications              Supporting documentation for GIP need for symptom control:  [x]  Sudden decline necessitating intensive nursing intervention  []  Uncontrolled / intractable nausea or vomiting   []  Pathological fractures  []  Advanced open wounds requiring frequent skilled care  [] Unmanageable respiratory distress  [x] New or worsening delirium   [] Delirium with behavior issues  [x] Imminent death - with skilled nursing needs documented above    Dario Carrizales NP

## 2017-08-31 NOTE — PROGRESS NOTES
Verbal shift change report given to Talmadge Severin, RN by Comfort Anthony RN. Report included the following information SBAR, Kardex, Intake/Output and MAR. 2035  Medicated with scheduled robinul, dilaudid, and ativan IV.  2100  Family at bedside refused to allow patient to be bathed tonight. 2200  Medicated with Atropine for secretions and scheduled dilaudid and ativan IV.  0007  Patient medicated with Toradol IV for temp 100.6; as well as scheduled robinul, dilaudid and ativan IV.  0200  Patient medicated with PRN Robinul IV for excessive secretions as well as atropine. Medicated with scheduled dilaudid and ativan. 0400  Medicated with scheduled dilaudid and ativan. 2128  Medicated with scheduled robinul.    0600  Medicated with scheduled dilaudid and ativan.      0645  Medicated with PRN atropine. NAME OF PATIENT:  Jose Street    LEVEL OF CARE:  GIP    REASON FOR GIP:   Pain, despite numerous changes in medications, Medication adjustment that must be monitored 24/7 and Stabilizing treatment that cannot take place at home    PATIENT REMAINS ELIGIBLE FOR GIP LEVEL OF CARE AS EVIDENCED BY:   GIP level of care will be continued as patient still requiring frequent nursing assessments and change in medications. REASON FOR RESPITE:  Patient not in Respite care at this time. O2 SAFETY:  Patient is on Room Air. FALL INTERVENTIONS PROVIDED:   Patient unresponsive and unable to move at this time. INTERDISPLINARY COMMUNICATION/COLLABORATION:  Physician, MSW, Marley and RN, CNA    NEW MEDICATION INITIATION DOCUMENTATION:  No new medications nor interventions begun on this night shift.     Reason medication is being initiated:  N/A    MD / Provider name consulted re: change in status / initiation of new medication:  N/A    New Symptom(s):  N/A    New Order(s):  N/A    Name of the person notified of the changes:  N/A    Name of person being taught:  N/A    Instructions given:  N/A    Side Effects taught:  N/A    Response to teaching:  N/A    COMFORTABLE DYING MEASURE:  Monitor for pain, dyspnea, agitation, and shortness of breath and intervene accordingly. Is Patient/family satisfied with symptom level?  yes    DISCHARGE PLAN:  Patient declining rapidly and will most likely pass here at [facility].

## 2017-08-31 NOTE — PROGRESS NOTES
0700 Report received from [facility]  2 sisters in room  Asked about VS.  Advised little change. Client warm to touch. Repositioned on L side. Meds as per MARs  1000 DTR and sisters in room and they advise they think she is going. Breathing load with tongue partially occluding airway. Respirations labored. Meds per [facility]  Repositioned client with relief of tongue occlusion. Educated family clients pulse remains the same. Mouth care provided. Eyes dry with particulate also cleaned. Temp per Ear thermometer is 102-but extremities no longer feel warm will monitor at this time. Peripheral cyanosis very obvious to fingertips  1117   from [facility] hospice present and advising hospice nurse from home. Client   4 sisters and DTR present. Xiomara Hall notifed via phone call. Kobi Nietoclayton also came to check on family. DTR and sisters removed belongings. To call [facility] to service family.       NAME OF PATIENT:  Michelle Issue    LEVEL OF CARE:  [facility]    REASON FOR GIP:   Pain, despite numerous changes in medications, Unmanageable respiratory distress, Terminal agitation, despite changes to medications and Stabilizing treatment that cannot take place at home    *PATIENT REMAINS ELIGIBLE FOR [facility] LEVEL OF CARE AS EVIDENCED BY: Frequent delivery of IV medications to maintain comfort    O2 SAFETY:  No O2 in place at this time    FALL INTERVENTIONS PROVIDED:   Implemented/recommended environmental changes (remove hazards, lower bed, improve lighting, etc.) and Implemented/recommended increased supervision/assistance    INTERDISPLINARY COMMUNICATION/COLLABORATION:  Physician, MSW, [place] and RN, CNA    NEW MEDICATION INITIATION DOCUMENTATION:  No changes at this time    Reason medication is being initiated:  N/A    MD / Provider name consulted re: change in status / initiation of new medication:  N/A    New Symptom(s):  N/A    New Order(s):  N/A    Name of the person notified of the changes: N/A    Name of person being taught:  Sisters and DTR in room    Instructions given:  N/A    Side Effects taught:  N/A    Response to teaching:  N/A      COMFORTABLE DYING MEASURE:  Is Patient/family satisfied with symptom level? Yes    DISCHARGE PLAN:  Likely will  at hospice house.

## 2017-08-31 NOTE — PROGRESS NOTES
[id number] [address] Death Visit    I was notified of the death of this [facility] patient with her family present. I visited the room of the  and met with her family with whom I had met on the . With them was the Angel Calderon, from [facility]. He had been present at the time of the patient's death and was providing excellent Spiritual Care and Support. I offered my condolences, sympathies, and words of comfort and assurance. I excused myself to let Angelica Reinoso continue his ministry. Later, when the family left the [facility],. I met them in the barraza and again thanked them for allowing us to walk with them during this difficult journey. They were appreciative for the support and care their mother had received. PLAN:  Coordinate with Angelica Reinoso at [facility] as requested.

## 2017-09-20 NOTE — DISCHARGE SUMMARY
Discharge Summary    [employer]  [facility] Help to Those in Need  [phone]      Date of Admission: 8/25/2017  Date of Discharge: 8/31/2017    Lita Wilde is a 74y.o. year old who was admitted to [facility] at [facility] with a Hospice diagnosis of Lungs Ca. Pt was followed by hospice community care and transferred here to [facility] for [facility] level care due to uncontrolled symptoms and cared for as below; Active Symptoms:  1. Generalized pain, especially head secondary to cancer  2. Terminal agitation with moaning and crying out \"help me\"  3. Metastatic lung cancer to brain with recent procedures as noted above      PLAN   1. Patient admitted to [facility] level of care because of recent medication adjustments not effective, rapid decline, and need for frequent nursing assessments. 2. Generalized pain-in the middle of night, port accessed because SL morphine not effective and started on scheduled dilaudid 1 mg IV every 4 hours. When I enter the room, patient writhing and asking for help, holding head and clearly uncomfortable. Sister who was with her last night stated dilaudid seemed to help for a couple hours  3. Will start dilaudid PCA-1 mg/hour basal with 0.5 bolus every 6 minutes. Explained how this works with family  4. Agitation-may be terminal-will start ativan 2 mg IV every 4 hours scheduled and every 15 minutes prn. In addition, haldol 2 mg IV every 4 hours prn        The patient's care was focused on comfort and the patient passed away on 8/31/2017.